# Patient Record
Sex: MALE | Race: WHITE | NOT HISPANIC OR LATINO
[De-identification: names, ages, dates, MRNs, and addresses within clinical notes are randomized per-mention and may not be internally consistent; named-entity substitution may affect disease eponyms.]

---

## 2023-04-18 PROBLEM — Z00.00 ENCOUNTER FOR PREVENTIVE HEALTH EXAMINATION: Status: ACTIVE | Noted: 2023-04-18

## 2023-04-19 ENCOUNTER — APPOINTMENT (OUTPATIENT)
Dept: RADIOLOGY | Facility: CLINIC | Age: 88
End: 2023-04-19
Payer: COMMERCIAL

## 2023-04-19 ENCOUNTER — APPOINTMENT (OUTPATIENT)
Dept: CT IMAGING | Facility: CLINIC | Age: 88
End: 2023-04-19
Payer: COMMERCIAL

## 2023-04-19 PROCEDURE — 70140 X-RAY EXAM OF FACIAL BONES: CPT

## 2023-04-19 PROCEDURE — 71120 X-RAY EXAM BREASTBONE 2/>VWS: CPT

## 2023-04-19 PROCEDURE — 70450 CT HEAD/BRAIN W/O DYE: CPT

## 2023-09-21 ENCOUNTER — APPOINTMENT (OUTPATIENT)
Dept: MRI IMAGING | Facility: CLINIC | Age: 88
End: 2023-09-21

## 2023-10-13 ENCOUNTER — APPOINTMENT (OUTPATIENT)
Dept: ULTRASOUND IMAGING | Facility: CLINIC | Age: 88
End: 2023-10-13
Payer: COMMERCIAL

## 2023-10-13 ENCOUNTER — APPOINTMENT (OUTPATIENT)
Dept: MRI IMAGING | Facility: CLINIC | Age: 88
End: 2023-10-13
Payer: SELF-PAY

## 2023-10-13 PROCEDURE — 72148 MRI LUMBAR SPINE W/O DYE: CPT

## 2023-10-13 PROCEDURE — 93970 EXTREMITY STUDY: CPT

## 2024-02-07 ENCOUNTER — TRANSCRIPTION ENCOUNTER (OUTPATIENT)
Age: 89
End: 2024-02-07

## 2024-02-07 ENCOUNTER — APPOINTMENT (OUTPATIENT)
Dept: VASCULAR SURGERY | Facility: CLINIC | Age: 89
End: 2024-02-07
Payer: COMMERCIAL

## 2024-02-07 VITALS
DIASTOLIC BLOOD PRESSURE: 80 MMHG | WEIGHT: 200 LBS | HEART RATE: 66 BPM | SYSTOLIC BLOOD PRESSURE: 154 MMHG | BODY MASS INDEX: 30.31 KG/M2 | HEIGHT: 68 IN

## 2024-02-07 DIAGNOSIS — K43.9 VENTRAL HERNIA W/OUT OBSTRUCTION OR GANGRENE: ICD-10-CM

## 2024-02-07 DIAGNOSIS — R60.0 LOCALIZED EDEMA: ICD-10-CM

## 2024-02-07 DIAGNOSIS — E66.9 OBESITY, UNSPECIFIED: ICD-10-CM

## 2024-02-07 DIAGNOSIS — Z87.891 PERSONAL HISTORY OF NICOTINE DEPENDENCE: ICD-10-CM

## 2024-02-07 DIAGNOSIS — M79.89 OTHER SPECIFIED SOFT TISSUE DISORDERS: ICD-10-CM

## 2024-02-07 DIAGNOSIS — Z85.828 PERSONAL HISTORY OF OTHER MALIGNANT NEOPLASM OF SKIN: ICD-10-CM

## 2024-02-07 PROCEDURE — 93970 EXTREMITY STUDY: CPT

## 2024-02-07 PROCEDURE — 99204 OFFICE O/P NEW MOD 45 MIN: CPT

## 2024-02-08 ENCOUNTER — EMERGENCY (EMERGENCY)
Facility: HOSPITAL | Age: 89
LOS: 1 days | Discharge: HOME | End: 2024-02-08
Attending: EMERGENCY MEDICINE | Admitting: EMERGENCY MEDICINE
Payer: COMMERCIAL

## 2024-02-08 VITALS
WEIGHT: 199.96 LBS | DIASTOLIC BLOOD PRESSURE: 79 MMHG | SYSTOLIC BLOOD PRESSURE: 179 MMHG | TEMPERATURE: 98 F | HEART RATE: 60 BPM | HEIGHT: 68 IN | OXYGEN SATURATION: 95 % | RESPIRATION RATE: 20 BRPM

## 2024-02-08 VITALS
TEMPERATURE: 97 F | RESPIRATION RATE: 17 BRPM | OXYGEN SATURATION: 96 % | SYSTOLIC BLOOD PRESSURE: 164 MMHG | HEART RATE: 73 BPM | DIASTOLIC BLOOD PRESSURE: 67 MMHG

## 2024-02-08 DIAGNOSIS — S62.616A DISPLACED FRACTURE OF PROXIMAL PHALANX OF RIGHT LITTLE FINGER, INITIAL ENCOUNTER FOR CLOSED FRACTURE: ICD-10-CM
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
## 2024-02-08 DIAGNOSIS — S02.2XXB FRACTURE OF NASAL BONES, INITIAL ENCOUNTER FOR OPEN FRACTURE: ICD-10-CM

## 2024-02-08 DIAGNOSIS — Z23 ENCOUNTER FOR IMMUNIZATION: ICD-10-CM
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
## 2024-02-08 DIAGNOSIS — S01.21XA LACERATION WITHOUT FOREIGN BODY OF NOSE, INITIAL ENCOUNTER: ICD-10-CM

## 2024-02-08 PROBLEM — E66.9 OBESITY (BMI 30.0-34.9): Status: ACTIVE | Noted: 2024-02-08

## 2024-02-08 PROBLEM — K43.9 VENTRAL HERNIA: Status: ACTIVE | Noted: 2024-02-08

## 2024-02-08 PROBLEM — Z85.828 HISTORY OF SKIN CANCER: Status: ACTIVE | Noted: 2024-02-07

## 2024-02-08 PROBLEM — Z87.891 FORMER SMOKER: Status: ACTIVE | Noted: 2024-02-07

## 2024-02-08 PROBLEM — M79.89 LEG SWELLING: Status: ACTIVE | Noted: 2024-02-07

## 2024-02-08 PROBLEM — R60.0 BILATERAL LEG EDEMA: Status: ACTIVE | Noted: 2024-02-08

## 2024-02-08 PROCEDURE — 70450 CT HEAD/BRAIN W/O DYE: CPT | Mod: MG

## 2024-02-08 PROCEDURE — 90471 IMMUNIZATION ADMIN: CPT

## 2024-02-08 PROCEDURE — 70486 CT MAXILLOFACIAL W/O DYE: CPT | Mod: 26,MG

## 2024-02-08 PROCEDURE — G1004: CPT

## 2024-02-08 PROCEDURE — 26725 TREAT FINGER FRACTURE EACH: CPT | Mod: F9

## 2024-02-08 PROCEDURE — 99285 EMERGENCY DEPT VISIT HI MDM: CPT | Mod: 25

## 2024-02-08 PROCEDURE — 70486 CT MAXILLOFACIAL W/O DYE: CPT | Mod: MG

## 2024-02-08 PROCEDURE — 21315 CLSD TX NSL FX MNPJ WO STBLJ: CPT

## 2024-02-08 PROCEDURE — 99284 EMERGENCY DEPT VISIT MOD MDM: CPT

## 2024-02-08 PROCEDURE — 73130 X-RAY EXAM OF HAND: CPT | Mod: 26,RT

## 2024-02-08 PROCEDURE — 90715 TDAP VACCINE 7 YRS/> IM: CPT

## 2024-02-08 PROCEDURE — 73130 X-RAY EXAM OF HAND: CPT

## 2024-02-08 PROCEDURE — 70450 CT HEAD/BRAIN W/O DYE: CPT | Mod: 26,MG

## 2024-02-08 PROCEDURE — 13152 CMPLX RPR E/N/E/L 2.6-7.5 CM: CPT | Mod: XU

## 2024-02-08 RX ORDER — MUPIROCIN 20 MG/G
1 OINTMENT TOPICAL
Qty: 1 | Refills: 0
Start: 2024-02-08

## 2024-02-08 RX ORDER — ACETAMINOPHEN, DEXTROMETHORPHAN HYDROBROMIDE, GUAIFENESIN, AND PHENYLEPHRINE HYDROCHLORIDE 325; 10; 200; 5 MG/1; MG/1; MG/1; MG/1
CAPSULE, LIQUID FILLED ORAL
Refills: 0 | Status: ACTIVE | COMMUNITY

## 2024-02-08 RX ORDER — ROSUVASTATIN CALCIUM 5 MG/1
TABLET, FILM COATED ORAL
Refills: 0 | Status: ACTIVE | COMMUNITY

## 2024-02-08 RX ORDER — PYRIDOSTIGMINE BROMIDE 60 MG/1
TABLET ORAL
Refills: 0 | Status: ACTIVE | COMMUNITY

## 2024-02-08 RX ORDER — DOCUSATE SODIUM 100 MG/1
CAPSULE ORAL
Refills: 0 | Status: ACTIVE | COMMUNITY

## 2024-02-08 RX ORDER — TETANUS TOXOID, REDUCED DIPHTHERIA TOXOID AND ACELLULAR PERTUSSIS VACCINE, ADSORBED 5; 2.5; 8; 8; 2.5 [IU]/.5ML; [IU]/.5ML; UG/.5ML; UG/.5ML; UG/.5ML
0.5 SUSPENSION INTRAMUSCULAR ONCE
Refills: 0 | Status: COMPLETED | OUTPATIENT
Start: 2024-02-08 | End: 2024-02-08

## 2024-02-08 RX ADMIN — TETANUS TOXOID, REDUCED DIPHTHERIA TOXOID AND ACELLULAR PERTUSSIS VACCINE, ADSORBED 0.5 MILLILITER(S): 5; 2.5; 8; 8; 2.5 SUSPENSION INTRAMUSCULAR at 14:37

## 2024-02-08 NOTE — ED PROVIDER NOTE - PATIENT PORTAL LINK FT
You can access the FollowMyHealth Patient Portal offered by [facility] by registering at the following website: [email or web address]. By joining [employer]’s FollowMyHealth portal, you will also be able to view your health information using other applications (apps) compatible with our system.

## 2024-02-08 NOTE — ED PROVIDER NOTE - MUSCULOSKELETAL, MLM
right hand +ecchymosis to 5th digit on on palmar ulnar portion of hand, +FROM 5th phalanx, sensation intact distally

## 2024-02-08 NOTE — ED ADULT NURSE NOTE - OTHER COMPLAINTS
Patient arrived to the ER with son c/o nose wound and rt hand pinky pain (bruise noted to affected area) s/p rolling off of bed this morning @5AM. Pt was told by [facility] to come to ER. Pt denies LOC, blood thinner usage, dizziness, vision changes, neck pain, N/V/D and denies any other medical complaints. Pt is noted to be aox3, able to maintain airway, having nonlabored breathing, no retractions noted, fully mobile with cane, non diaphoretic and able to talk in clear full sentences,

## 2024-02-08 NOTE — ED ADULT TRIAGE NOTE - OTHER COMPLAINTS
Patient arrived to the ER with son c/o nose wound and rt hand pinky pain s/p rolling off of bed this morning @5AM. Pt denies LOC, blood thinner usage and reports no other medical complaints. Pt is noted to be aox3, able to maintain airway, having nonlabored breathing, no retractions noted, non diaphoretic and able to talk in clear full sentences, Patient arrived to the ER with son c/o nose wound and rt hand pinky pain s/p rolling off of bed this morning @5AM. Pt denies LOC, blood thinner usage, dizziness, vision changes and denies any other medical complaints. Pt is noted to be aox3, able to maintain airway, having nonlabored breathing, no retractions noted, non diaphoretic and able to talk in clear full sentences, Patient arrived to the ER with son c/o nose wound and rt hand pinky pain (bruise noted to affected area) s/p rolling off of bed this morning @5AM. Pt was told by [facility] to come to ER. Pt denies LOC, blood thinner usage, dizziness, vision changes, neck pain, N/V/D and denies any other medical complaints. Pt is noted to be aox3, able to maintain airway, having nonlabored breathing, no retractions noted, fully mobile with cane, non diaphoretic and able to talk in clear full sentences,

## 2024-02-08 NOTE — ED ADULT NURSE NOTE - NSFALLUNIVINTERV_ED_ALL_ED
Latavya
no
Bed/Stretcher in lowest position, wheels locked, appropriate side rails in place/Call bell, personal items and telephone in reach/Instruct patient to call for assistance before getting out of bed/chair/stretcher/Non-slip footwear applied when patient is off stretcher/[place] to call system/Physically safe environment - no spills, clutter or unnecessary equipment/Purposeful proactive rounding/Room/bathroom lighting operational, light cord in reach

## 2024-02-08 NOTE — ED PROVIDER NOTE - NSFOLLOWUPINSTRUCTIONS_ED_ALL_ED_FT
Finger Fracture, Adult  A finger fracture is a break in any of the bones in your fingers. Your doctor may put a splint or cast on your finger so it will not move while it heals.    What are the causes?  The main cause of a finger fracture is injury, such as from playing sports, falling, or closing your finger in a drawer or door.    What increases the risk?  You may be more likely to get this condition:  Playing sports.  Working with machinery.  If you have weak bones (osteoporosis).  What are the signs or symptoms?  The main symptoms of a fractured finger are pain, bruising, and swelling shortly after the injury.    How is this treated?  Treatment depends on how severe your fracture is. If the bones are still in place, your doctor may put your finger in a splint. If many fingers are fractured, you may need a cast. A cast may be placed up to the elbow. This will keep your fingers and hand from moving.    If the bones are out of place, your doctor may move them back into place by hand or by surgery.    You may also need to do physical therapy exercises to help your finger move better and get stronger.    Follow these instructions at home:  If you have a removable splint:    Hand showing finger splint on index and middle fingers and wrist.  Wear the splint as told by your doctor. Take it off only as told by your doctor.  Check the skin around the splint every day. Tell your doctor if you see problems.  Loosen the splint if your fingers:  Tingle.  Become numb.  Turn cold and blue.  Keep the splint clean and dry.  If you have a nonremovable cast:    Do not put pressure on any part of the cast until it is fully hardened.  Do not stick anything inside the cast to scratch your skin.  Check the skin around the cast every day. Tell your doctor if you see problems.  You may put lotion on dry skin around the cast. Do not put lotion on the skin under the cast.  Keep the cast clean and dry.  Bathing    Do not take baths, swim, or use a hot tub. Ask your doctor if you may take showers.  If the splint or cast is not waterproof:  Do not let it get wet.  Cover it with a watertight covering when you take a bath or shower.  Managing pain, stiffness, and swelling    If told, put ice on the injured area. To do this:  If you have a removable splint, take it off as told by your doctor.  Put ice in a plastic bag.  Place a towel between your skin and the bag, or between your cast and the bag.  Leave the ice on for 20 minutes, 2–3 times a day.  Take off the ice if your skin turns bright red. This is very important. If you cannot feel pain, heat, or cold, you have a greater risk of damage to the area.  Move your fingers often.  Raise the injured area above the level of your heart while you are sitting or lying down.  Activity    Ask your doctor if you should avoid driving or using machines while you are taking your medicine.  Do physical therapy exercises as told by your doctor.  Return to your normal activities when your doctor says that it is safe.  Ask your doctor when it is safe to drive if you have a splint or cast on your finger.  General instructions    Do not smoke or use any products that contain nicotine or tobacco. Using these products can make it take longer for your bones to heal. If you need help quitting, ask your doctor.  Take over-the-counter and prescription medicines only as told by your doctor.  Keep all follow-up visits.  Contact a doctor if:  Your pain or swelling gets worse, even with treatment.  You have trouble moving your finger.  Get help right away if:  Your finger gets numb or turns blue.  Summary  A finger fracture is a break in any of the bones in your fingers.  You may need to wear a splint or cast on your finger, so it will not move while it heals.  If told, put ice on the injured area for 20 minutes, 2–3 times a day.  This information is not intended to replace advice given to you by your health care provider. Make sure you discuss any questions you have with your health care provider.

## 2024-02-08 NOTE — ED PROVIDER NOTE - OBJECTIVE STATEMENT
88 y/o m presents s/p fall out of bed this morning with laceration to nose.  Pt stating he was awake, laying too close to the edge when he fell over, hit his nose on the nightstand causing the laceration.  Pt reports mild pain to right hand, pinky finger.  Denies LOC, neck/back pain, all other ROS negative.  Unknown last tetanus.

## 2024-02-08 NOTE — ED ADULT NURSE NOTE - OBJECTIVE STATEMENT
89yoM came to ED s/p trip and falling into bedside table this am., Pt denies LOC and blood thinner use. + head strike. Pt states he went to  and they sent him to the ED. Pt c/o R fifth finger pain bruising and swelling, denies numbness and tingling. Ice packs applied. L nasal packed, bleeding controlled. Neuro intact. No SOB, chest pain. Pt ambulates with steady gait.

## 2024-02-08 NOTE — ED PROVIDER NOTE - CLINICAL SUMMARY MEDICAL DECISION MAKING FREE TEXT BOX
88 y/o m presents with facial lac s/p fall out of bed, right hand pain.  No LOC, no neuro deficits on exam, tetanus updated in ED.  CT head neg, CT maxillofacial shows b/l nasal bone fracture, xray right hand +proximal phalanx fracture.  Complicated lac repaired by plastics Dr. Red in ED, placed in fingersplint to right 5th phalanx, d/c on augmentin to f/u outpatient.

## 2024-02-08 NOTE — ED PROVIDER NOTE - ATTENDING CONTRIBUTION TO CARE
89 yo rolled out of bed, leaned too far, hit face on dresser, nostril tore/w flap, Denies hitting head hard, LOC, vision changes, focal weakness/numbness, neck/back pain, SOB/CP, HA, abd pain. Not on AC. Bruise on left hand. pending imaging, lac repair, reassess. 89 yo rolled out of bed, leaned too far, hit face on dresser, nostril tore w flap, Denies hitting head hard, LOC, vision changes, focal weakness/numbness, neck/back pain, SOB/CP, HA, abd pain. Not on AC. Bruise on left hand. pending imaging, lac repair, reassess.

## 2024-02-08 NOTE — ED PROVIDER NOTE - NS ED ATTENDING STATEMENT MOD
I have seen and examined this patient and fully participated in the care of this patient as the teaching attending.  The service was shared with the AKBAR.  I reviewed and verified the documentation.

## 2024-02-14 NOTE — ASSESSMENT
[Arterial/Venous Disease] : arterial/venous disease [Medication Management] : medication management [FreeTextEntry1] : 89 y/o M w/ BLE moderate edema R>L with mild SSV insufficiency. No evidence of thrombosis or GSV reflux. Obesity (BMI 30.41) and ventral hernia.  On exam, large abdominal girth with large reducible ventral hernia. Bounding femoral, popliteal, and pedal pulses equal bilaterally. BLE moderate edema R>L.  Venous duplex showed no DVT/SVT bilaterally. No deep or GSV reflux. Mild SSV reflux bilaterally.  Findings discussed with pt and wife. The swelling is not vascular in nature. The SSV reflux bilaterally is very mild and would not cause the degree of swelling present. We will order CT Venogram A/P to r/o any abdominopelvic vein compression and eval the ventral hernia. Recommendations include also to see a GI specialist for a routine colonoscopy. Additionally, I advised him to discuss with Dr Mccain if he is a good candidate for diuretic. Advised to wear compression stockings daily and pt to keep the skin well moisturized. Walking encouraged on a daily basis and avoid standing or sitting down for too long. Return prn, will call him with CT scan results.

## 2024-02-14 NOTE — PHYSICAL EXAM
[Respiratory Effort] : normal respiratory effort [No Rash or Lesion] : No rash or lesion [Alert] : alert [Oriented to Person] : oriented to person [Oriented to Place] : oriented to place [Oriented to Time] : oriented to time [Calm] : calm [2+] : left 2+ [Ankle Swelling (On Exam)] : present [Ankle Swelling Bilaterally] : bilaterally  [Ankle Swelling On The Left] : moderate [Abdomen Masses] : Abdomen masses present [Varicose Veins Of Lower Extremities] : not present [] : not present [Abdomen Tenderness] : ~T ~M No abdominal tenderness [FreeTextEntry1] : BLE moderate edema R>L. [de-identified] : WN/WD [de-identified] : Obese, large abdominal girth with large reducible ventral hernia.  [de-identified] : FROM

## 2024-02-14 NOTE — PROCEDURE
[FreeTextEntry1] :  Venous duplex ordered to r/o elida, shows: no DVT/SVT bilaterally. No deep or GSV reflux. Mild SSV reflux bilaterally.

## 2024-02-14 NOTE — CONSULT LETTER
[Dear  ___] : Dear  [unfilled], [FreeTextEntry2] : Ros Mccain M.D. [address], [address] [place] [address]  [FreeTextEntry1] : Thank you for referring Mr Kamlesh Kruger. He reports swelling on both legs which started around 3 months ago.  He has no past history of venous problems or thrombosis.  There is no history of kidney or heart disease.  He had an echocardiogram 6 months ago as well as a coronary CT scan with no significant findings. He has chronic lumbosacral spine issues as well as an midline abdominal hernia.  He is overweight. Denies any recent weight gain or weight loss. He reports constipation issues; last colonoscopy was 5 years ago.    On exam, large abdominal girth with large reducible ventral hernia. Bounding femoral, popliteal, and pedal pulses equal bilaterally. Moderate edema on both legs, worse on the right side.  Venous duplex showed no evidence of thrombosis or deep venous insufficiency in either leg.     Mr Kruger's swelling is not vascular in nature.  I recommend obtaining a CT venogram to rule out abdominal or pelvic abnormalities and/or venous compression.  I also suggested a GI evaluation for routine colonoscopy. He may be a good candidate for low-dose diuretic but  I advised him and his wife to discuss this with you.  If he can tolerate them I would also suggest compression stockings.  He also needs to keep the skin of his feet and legs well moisturized.    I will keep you posted regarding the results of the CT scan.  My complete EMR office note is below for your records.  [FreeTextEntry3] : Sincerely,   Matty Gao M.D.  , Surgical Services [facility] Surgeon-in-Chief, [facility] Professor of Surgery, [employer] at [facility]

## 2024-02-14 NOTE — HISTORY OF PRESENT ILLNESS
[FreeTextEntry1] : 89 y/o M referred by Dr Ros Mccain. He has a PMHx of arthritis, cellulitis, bladder stones, prostatectomy, ventral hernia, retina and cataract surgery, obesity (BMI 30.41) and skin cancer. He presents today for evaluation of leg and ankle swelling. He reports that the swelling started ~3 months ago. Denies any palpable cords, hx of DVT/SVT, wounds, leg surgeries, large bulging veins, rest pain, leg coolness or skin discoloration. No reports of kidney or heart disease. His last echo was ~6 months ago and also had a coronary CT scan, no significant findings per pt. He also endorses lower back pain that has been occurring for ~ 3 months, mainly when he has been sitting for a while or when he is standing up. He has issues with his balance and uses a cane for walking. He had a MRI of the lumbar spine recently. Denies any weight gain/loss, SOB, trouble with urination however he does report constipation. He reports he is on Colace and X-lax to treat his constipation, which has been worsening. He has not have a colonoscopy in a while. Last one was ~5 yrs ago at [facility].   SHx: - Works as realtor  Accompanied by wife.

## 2024-02-14 NOTE — ADDENDUM
[FreeTextEntry1] : I, Dr. Matty Gao, personally performed the evaluation and management (E/M) services for this new patient.  That E/M includes conducting the initial examination, assessing all conditions, and establishing the plan of care.  Today, my ACP, Emily Santos NP, was here to observe my evaluation and management services for this patient to be followed going forward.

## 2024-02-22 ENCOUNTER — OUTPATIENT (OUTPATIENT)
Dept: OUTPATIENT SERVICES | Facility: HOSPITAL | Age: 89
LOS: 1 days | End: 2024-02-22
Payer: COMMERCIAL

## 2024-02-22 ENCOUNTER — APPOINTMENT (OUTPATIENT)
Dept: CT IMAGING | Facility: CLINIC | Age: 89
End: 2024-02-22
Payer: COMMERCIAL

## 2024-02-22 PROCEDURE — 73140 X-RAY EXAM OF FINGER(S): CPT

## 2024-02-22 PROCEDURE — 73140 X-RAY EXAM OF FINGER(S): CPT | Mod: 26,RT

## 2024-02-22 PROCEDURE — 74174 CTA ABD&PLVS W/CONTRAST: CPT

## 2024-03-05 ENCOUNTER — OUTPATIENT (OUTPATIENT)
Dept: OUTPATIENT SERVICES | Facility: HOSPITAL | Age: 89
LOS: 1 days | End: 2024-03-05
Payer: MEDICARE

## 2024-03-05 PROCEDURE — 73130 X-RAY EXAM OF HAND: CPT

## 2024-03-05 PROCEDURE — 73130 X-RAY EXAM OF HAND: CPT | Mod: 26,RT

## 2025-02-06 ENCOUNTER — EMERGENCY (EMERGENCY)
Facility: HOSPITAL | Age: 89
LOS: 1 days | Discharge: HOME | End: 2025-02-06
Attending: STUDENT IN AN ORGANIZED HEALTH CARE EDUCATION/TRAINING PROGRAM | Admitting: STUDENT IN AN ORGANIZED HEALTH CARE EDUCATION/TRAINING PROGRAM
Payer: MEDICARE

## 2025-02-06 VITALS
HEART RATE: 75 BPM | OXYGEN SATURATION: 100 % | SYSTOLIC BLOOD PRESSURE: 157 MMHG | DIASTOLIC BLOOD PRESSURE: 97 MMHG | TEMPERATURE: 99 F | RESPIRATION RATE: 19 BRPM | WEIGHT: 179.9 LBS | HEIGHT: 68 IN

## 2025-02-06 VITALS
TEMPERATURE: 99 F | OXYGEN SATURATION: 100 % | DIASTOLIC BLOOD PRESSURE: 78 MMHG | HEART RATE: 68 BPM | RESPIRATION RATE: 18 BRPM | SYSTOLIC BLOOD PRESSURE: 141 MMHG
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
## 2025-02-06 DIAGNOSIS — Z79.01 LONG TERM (CURRENT) USE OF ANTICOAGULANTS: ICD-10-CM

## 2025-02-06 DIAGNOSIS — S00.11XA CONTUSION OF RIGHT EYELID AND PERIOCULAR AREA, INITIAL ENCOUNTER: ICD-10-CM
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
## 2025-02-06 DIAGNOSIS — I48.91 UNSPECIFIED ATRIAL FIBRILLATION: ICD-10-CM

## 2025-02-06 DIAGNOSIS — S09.93XA UNSPECIFIED INJURY OF FACE, INITIAL ENCOUNTER: ICD-10-CM

## 2025-02-06 DIAGNOSIS — G70.00 MYASTHENIA GRAVIS WITHOUT (ACUTE) EXACERBATION: ICD-10-CM

## 2025-02-06 LAB
ALBUMIN SERPL ELPH-MCNC: 3.6 G/DL — SIGNIFICANT CHANGE UP (ref 3.3–5)
ALP SERPL-CCNC: 65 U/L — SIGNIFICANT CHANGE UP (ref 40–120)
ALT FLD-CCNC: 17 U/L — SIGNIFICANT CHANGE UP (ref 10–45)
ANION GAP SERPL CALC-SCNC: 10 MMOL/L — SIGNIFICANT CHANGE UP (ref 5–17)
APTT BLD: 35.5 SEC — SIGNIFICANT CHANGE UP (ref 24.5–35.6)
AST SERPL-CCNC: 29 U/L — SIGNIFICANT CHANGE UP (ref 10–40)
BASOPHILS # BLD AUTO: 0.02 K/UL — SIGNIFICANT CHANGE UP (ref 0–0.2)
BASOPHILS NFR BLD AUTO: 0.2 % — SIGNIFICANT CHANGE UP (ref 0–2)
BILIRUB SERPL-MCNC: 0.6 MG/DL — SIGNIFICANT CHANGE UP (ref 0.2–1.2)
BUN SERPL-MCNC: 20 MG/DL — SIGNIFICANT CHANGE UP (ref 7–23)
CALCIUM SERPL-MCNC: 10 MG/DL — SIGNIFICANT CHANGE UP (ref 8.4–10.5)
CHLORIDE SERPL-SCNC: 103 MMOL/L — SIGNIFICANT CHANGE UP (ref 96–108)
CO2 SERPL-SCNC: 25 MMOL/L — SIGNIFICANT CHANGE UP (ref 22–31)
CREAT SERPL-MCNC: 1.12 MG/DL — SIGNIFICANT CHANGE UP (ref 0.5–1.3)
EGFR: 63 ML/MIN/1.73M2 — SIGNIFICANT CHANGE UP
EOSINOPHIL # BLD AUTO: 0.02 K/UL — SIGNIFICANT CHANGE UP (ref 0–0.5)
EOSINOPHIL NFR BLD AUTO: 0.2 % — SIGNIFICANT CHANGE UP (ref 0–6)
GLUCOSE SERPL-MCNC: 114 MG/DL — HIGH (ref 70–99)
HCT VFR BLD CALC: 45.2 % — SIGNIFICANT CHANGE UP (ref 39–50)
HGB BLD-MCNC: 14.9 G/DL — SIGNIFICANT CHANGE UP (ref 13–17)
IMM GRANULOCYTES NFR BLD AUTO: 0.7 % — SIGNIFICANT CHANGE UP (ref 0–0.9)
INR BLD: 1.21 — HIGH (ref 0.85–1.16)
LYMPHOCYTES # BLD AUTO: 0.57 K/UL — LOW (ref 1–3.3)
LYMPHOCYTES # BLD AUTO: 6.9 % — LOW (ref 13–44)
MCHC RBC-ENTMCNC: 29.9 PG — SIGNIFICANT CHANGE UP (ref 27–34)
MCHC RBC-ENTMCNC: 33 G/DL — SIGNIFICANT CHANGE UP (ref 32–36)
MCV RBC AUTO: 90.6 FL — SIGNIFICANT CHANGE UP (ref 80–100)
MONOCYTES # BLD AUTO: 0.88 K/UL — SIGNIFICANT CHANGE UP (ref 0–0.9)
MONOCYTES NFR BLD AUTO: 10.6 % — SIGNIFICANT CHANGE UP (ref 2–14)
NEUTROPHILS # BLD AUTO: 6.75 K/UL — SIGNIFICANT CHANGE UP (ref 1.8–7.4)
NEUTROPHILS NFR BLD AUTO: 81.4 % — HIGH (ref 43–77)
NRBC # BLD: 0 /100 WBCS — SIGNIFICANT CHANGE UP (ref 0–0)
NRBC BLD-RTO: 0 /100 WBCS — SIGNIFICANT CHANGE UP (ref 0–0)
PLATELET # BLD AUTO: 117 K/UL — LOW (ref 150–400)
POTASSIUM SERPL-MCNC: 4.1 MMOL/L — SIGNIFICANT CHANGE UP (ref 3.5–5.3)
POTASSIUM SERPL-SCNC: 4.1 MMOL/L — SIGNIFICANT CHANGE UP (ref 3.5–5.3)
PROT SERPL-MCNC: 6.6 G/DL — SIGNIFICANT CHANGE UP (ref 6–8.3)
PROTHROM AB SERPL-ACNC: 14.1 SEC — HIGH (ref 9.9–13.4)
RBC # BLD: 4.99 M/UL — SIGNIFICANT CHANGE UP (ref 4.2–5.8)
RBC # FLD: 15.9 % — HIGH (ref 10.3–14.5)
SODIUM SERPL-SCNC: 138 MMOL/L — SIGNIFICANT CHANGE UP (ref 135–145)
TROPONIN T, HIGH SENSITIVITY RESULT: 38 NG/L — SIGNIFICANT CHANGE UP (ref 0–51)
WBC # BLD: 8.3 K/UL — SIGNIFICANT CHANGE UP (ref 3.8–10.5)
WBC # FLD AUTO: 8.3 K/UL — SIGNIFICANT CHANGE UP (ref 3.8–10.5)

## 2025-02-06 PROCEDURE — 93010 ELECTROCARDIOGRAM REPORT: CPT

## 2025-02-06 PROCEDURE — 70486 CT MAXILLOFACIAL W/O DYE: CPT | Mod: MC

## 2025-02-06 PROCEDURE — 84484 ASSAY OF TROPONIN QUANT: CPT

## 2025-02-06 PROCEDURE — 93005 ELECTROCARDIOGRAM TRACING: CPT

## 2025-02-06 PROCEDURE — 72125 CT NECK SPINE W/O DYE: CPT | Mod: MC

## 2025-02-06 PROCEDURE — 82962 GLUCOSE BLOOD TEST: CPT

## 2025-02-06 PROCEDURE — 72125 CT NECK SPINE W/O DYE: CPT | Mod: 26

## 2025-02-06 PROCEDURE — 85610 PROTHROMBIN TIME: CPT

## 2025-02-06 PROCEDURE — 80053 COMPREHEN METABOLIC PANEL: CPT

## 2025-02-06 PROCEDURE — 70450 CT HEAD/BRAIN W/O DYE: CPT | Mod: 26

## 2025-02-06 PROCEDURE — 99285 EMERGENCY DEPT VISIT HI MDM: CPT | Mod: 25

## 2025-02-06 PROCEDURE — 70450 CT HEAD/BRAIN W/O DYE: CPT | Mod: MC

## 2025-02-06 PROCEDURE — 70486 CT MAXILLOFACIAL W/O DYE: CPT | Mod: 26

## 2025-02-06 PROCEDURE — 99284 EMERGENCY DEPT VISIT MOD MDM: CPT

## 2025-02-06 PROCEDURE — 85730 THROMBOPLASTIN TIME PARTIAL: CPT

## 2025-02-06 PROCEDURE — 36415 COLL VENOUS BLD VENIPUNCTURE: CPT

## 2025-02-06 PROCEDURE — 85025 COMPLETE CBC W/AUTO DIFF WBC: CPT

## 2025-02-06 NOTE — ED PROVIDER NOTE - NSFOLLOWUPINSTRUCTIONS_ED_ALL_ED_FT
Follow up with your primary medical doctor as soon as possible.    Return to the emergency department if your symptoms worsen or if you develop new symptoms.  If you have any problems with followup, please call the ED Referral Coordinator at [phone].    Fall Prevention in the Home, Adult    Falls can cause injuries and can affect people from all age groups. There are many simple things that you can do to make your home safe and to help prevent falls. Ask for help when making these changes, if needed.    What actions can I take to prevent falls?    General instructions:  •Use good lighting in all rooms. Replace any light bulbs that burn out.  •Turn on lights if it is dark. Use night-lights.  •Place frequently used items in easy-to-reach places. Lower the shelves around your home if necessary.  •Set up furniture so that there are clear paths around it. Avoid moving your furniture around.  •Remove throw rugs and other tripping hazards from the floor.  •Avoid walking on wet floors.  •Fix any uneven floor surfaces.  •Add color or contrast paint or tape to grab bars and handrails in your home. Place contrasting color strips on the first and last steps of stairways.  •When you use a stepladder, make sure that it is completely opened and that the sides are firmly locked. Have someone hold the ladder while you are using it. Do not climb a closed stepladder.  •Be aware of any and all pets.    What can I do in the bathroom?   •Keep the floor dry. Immediately clean up any water that spills onto the floor.  •Remove soap buildup in the tub or shower on a regular basis.  •Use non-skid mats or decals on the floor of the tub or shower.  •Attach bath mats securely with double-sided, non-slip rug tape.  •If you need to sit down while you are in the shower, use a plastic, non-slip stool.  •Install grab bars by the toilet and in the tub and shower. Do not use towel bars as grab bars.    What can I do in the bedroom?   •Make sure that a bedside light is easy to reach.  • Do not use oversized bedding that drapes onto the floor.  •Have a firm chair that has side arms to use for getting dressed.    What can I do in the kitchen?   •Clean up any spills right away.  •If you need to reach for something above you, use a sturdy step stool that has a grab bar.  •Keep electrical cables out of the way.  • Do not use floor polish or wax that makes floors slippery. If you must use wax, make sure that it is non-skid floor wax.    What can I do in the stairways?   • Do not leave any items on the stairs.  •Make sure that you have a light switch at the top of the stairs and the bottom of the stairs. Have them installed if you do not have them.  •Make sure that there are handrails on both sides of the stairs. Fix handrails that are broken or loose. Make sure that handrails are as long as the stairways.  •Install non-slip stair treads on all stairs in your home.  •Avoid having throw rugs at the top or bottom of stairways, or secure the rugs with carpet tape to prevent them from moving.  •Choose a carpet design that does not hide the edge of steps on the stairway.  •Check any carpeting to make sure that it is firmly attached to the stairs. Fix any carpet that is loose or worn.    What can I do on the outside of my home?   •Use bright outdoor lighting.  •Regularly repair the edges of walkways and driveways and fix any cracks.  •Remove high doorway thresholds.  •Trim any shrubbery on the main path into your home.  •Regularly check that handrails are securely fastened and in good repair. Both sides of any steps should have handrails.  •Install guardrails along the edges of any raised decks or porches.  •Clear walkways of debris and clutter, including tools and rocks.  •Have leaves, snow, and ice cleared regularly.  •Use sand or salt on walkways during winter months.  •In the garage, clean up any spills right away, including grease or oil spills.    What other actions can I take?   •Wear closed-toe shoes that fit well and support your feet. Wear shoes that have rubber soles or low heels.  •Use mobility aids as needed, such as canes, walkers, scooters, and crutches.  •Review your medicines with your health care provider. Some medicines can cause dizziness or changes in blood pressure, which increase your risk of falling.    Talk with your health care provider about other ways that you can decrease your risk of falls. This may include working with a physical therapist or  to improve your strength, balance, and endurance.    Where to find more information  •Centers for Disease Control and Prevention, [place]: https://www.cdc.gov  •National [place] on Aging: https://gu8jnzu.ghulam.nih.gov    Contact a health care provider if:  •You are afraid of falling at home.  •You feel weak, drowsy, or dizzy at home.  •You fall at home.    Summary  •There are many simple things that you can do to make your home safe and to help prevent falls.  •Ways to make your home safe include removing tripping hazards and installing grab bars in the bathroom.  •Ask for help when making these changes in your home.  This information is not intended to replace advice given to you by your health care provider. Make sure you discuss any questions you have with your health care provider.

## 2025-02-06 NOTE — ED PROVIDER NOTE - OBJECTIVE STATEMENT
89 yo M w PMH of afib on [facility], sent to ED for UC for mechanical fall w R facial trauma. Pt states he is unsure of how fall occurred, but he had no LOC. R face is swollen and bruised, no other injuries. Pt was ambulatory after the fall. His R eye is swollen shut, but prior to swelling, he notes vision was intact. Minimal pain. No neck pain.

## 2025-02-06 NOTE — ED PROVIDER NOTE - NS ED ROS FT
CONSTITUTIONAL: No fever, no chills, no fatigue, + fall  EYES: No eye redness, no visual changes  ENT: No ear pain, no sore throat  CARDIOVASCULAR: No chest pain, no palpitations  RESPIRATORY: No cough, no SOB  GI: No abdominal pain, no nausea, no vomiting, no constipation, no diarrhea  GENITOURINARY: No dysuria, no frequency, no hematuria  MUSCULOSKELETAL: No back pain, no joint pain, no myalgias  SKIN: No rash, no peripheral edema  NEURO: +headache, no confusion    ALL OTHER SYSTEMS NEGATIVE.

## 2025-02-06 NOTE — ED PROVIDER NOTE - CLINICAL SUMMARY MEDICAL DECISION MAKING FREE TEXT BOX
Presents s/p mechanical fall w head injury  Pt is on eliquis  Injury to R periorbital soft tissue, no evidence of occular injury, no change to vision.  Will obtain: CT head/C-spine/Maxillofacial  No other injuries, neuro intact, ambulatory at baseline in ED.

## 2025-02-06 NOTE — ED PROVIDER NOTE - PATIENT PORTAL LINK FT
You can access the FollowMyHealth Patient Portal offered by [facility] by registering at the following website: [email or web address]. By joining [employer]’s FollowMyHealth portal, you will also be able to view your health information using other applications (apps) compatible with our system.

## 2025-02-06 NOTE — ED ADULT NURSE NOTE - NSFALLHARMRISKINTERV_ED_ALL_ED

## 2025-02-06 NOTE — ED PROVIDER NOTE - PHYSICAL EXAMINATION
CONSTITUTIONAL: Non-toxic; in no apparent distress  HEAD: Normocephalic; + R periorbital bruising and edema  EYES: PERRL; EOM intact, no APD, no hyphema, no hypopyon  ENMT: External appears normal  NECK: Supple; non-tender  CARD: Normal S1, S2; no murmurs, rubs, or gallops  RESP: Normal chest excursion with respiration; breath sounds clear and equal bilaterally  ABD: Soft, non-distended; non-tender  EXT: Normal ROM in all four extremities; non-tender to palpation, no peripheral edema  SKIN: Warm, dry, no rash  NEURO:  No focal neurological deficiencies, CN 2-12 intact BL, no dysmetria, no pronator drift, gait normal, strength/sensation intact throughout, normal cognition

## 2025-02-06 NOTE — ED ADULT TRIAGE NOTE - CHIEF COMPLAINT QUOTE
Pt c/o fall this AM with residual face trauma and swelling. Pt endorsed "I was walking and I don't know what happened". Pt unclear LOC, endorsed positive use for anti coag takes Eliquis daily. Pt went to UC and referred to ER for CT imaging.  Waqar afib.

## 2025-02-06 NOTE — ED ADULT NURSE NOTE - OBJECTIVE STATEMENT
fall uknown mechanism on blood thinners large hematoma right for head denies head ache numbness tingling cp sob

## 2025-02-10 ENCOUNTER — INPATIENT (INPATIENT)
Facility: HOSPITAL | Age: 89
LOS: 2 days | Discharge: HOME | DRG: 871 | End: 2025-02-13
Attending: STUDENT IN AN ORGANIZED HEALTH CARE EDUCATION/TRAINING PROGRAM | Admitting: STUDENT IN AN ORGANIZED HEALTH CARE EDUCATION/TRAINING PROGRAM
Payer: MEDICARE

## 2025-02-10 VITALS
TEMPERATURE: 98 F | OXYGEN SATURATION: 86 % | WEIGHT: 179.9 LBS | SYSTOLIC BLOOD PRESSURE: 141 MMHG | RESPIRATION RATE: 20 BRPM | HEART RATE: 98 BPM | HEIGHT: 68 IN | DIASTOLIC BLOOD PRESSURE: 82 MMHG
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
## 2025-02-10 DIAGNOSIS — I48.91 UNSPECIFIED ATRIAL FIBRILLATION: ICD-10-CM

## 2025-02-10 DIAGNOSIS — Z29.9 ENCOUNTER FOR PROPHYLACTIC MEASURES, UNSPECIFIED: ICD-10-CM

## 2025-02-10 DIAGNOSIS — E78.5 HYPERLIPIDEMIA, UNSPECIFIED: ICD-10-CM

## 2025-02-10 DIAGNOSIS — R41.82 ALTERED MENTAL STATUS, UNSPECIFIED: ICD-10-CM

## 2025-02-10 DIAGNOSIS — J10.1 INFLUENZA DUE TO OTHER IDENTIFIED INFLUENZA VIRUS WITH OTHER RESPIRATORY MANIFESTATIONS: ICD-10-CM

## 2025-02-10 DIAGNOSIS — A41.9 SEPSIS, UNSPECIFIED ORGANISM: ICD-10-CM

## 2025-02-10 DIAGNOSIS — R60.0 LOCALIZED EDEMA: ICD-10-CM

## 2025-02-10 DIAGNOSIS — G70.00 MYASTHENIA GRAVIS WITHOUT (ACUTE) EXACERBATION: ICD-10-CM

## 2025-02-10 DIAGNOSIS — J96.01 ACUTE RESPIRATORY FAILURE WITH HYPOXIA: ICD-10-CM

## 2025-02-10 DIAGNOSIS — J18.9 PNEUMONIA, UNSPECIFIED ORGANISM: ICD-10-CM

## 2025-02-10 LAB
ALBUMIN SERPL ELPH-MCNC: 4 G/DL — SIGNIFICANT CHANGE UP (ref 3.3–5)
ALP SERPL-CCNC: 74 U/L — SIGNIFICANT CHANGE UP (ref 40–120)
ALT FLD-CCNC: 23 U/L — SIGNIFICANT CHANGE UP (ref 10–45)
ANION GAP SERPL CALC-SCNC: 14 MMOL/L — SIGNIFICANT CHANGE UP (ref 5–17)
APPEARANCE UR: CLEAR — SIGNIFICANT CHANGE UP
APTT BLD: 31.2 SEC — SIGNIFICANT CHANGE UP (ref 24.5–35.6)
AST SERPL-CCNC: 25 U/L — SIGNIFICANT CHANGE UP (ref 10–40)
BASE EXCESS BLDV CALC-SCNC: 7.5 MMOL/L — HIGH (ref -2–3)
BASOPHILS # BLD AUTO: 0 K/UL — SIGNIFICANT CHANGE UP (ref 0–0.2)
BASOPHILS NFR BLD AUTO: 0 % — SIGNIFICANT CHANGE UP (ref 0–2)
BILIRUB SERPL-MCNC: 0.9 MG/DL — SIGNIFICANT CHANGE UP (ref 0.2–1.2)
BILIRUB UR-MCNC: NEGATIVE — SIGNIFICANT CHANGE UP
BUN SERPL-MCNC: 19 MG/DL — SIGNIFICANT CHANGE UP (ref 7–23)
CALCIUM SERPL-MCNC: 10.1 MG/DL — SIGNIFICANT CHANGE UP (ref 8.4–10.5)
CHLORIDE SERPL-SCNC: 98 MMOL/L — SIGNIFICANT CHANGE UP (ref 96–108)
CO2 BLDV-SCNC: 35 MMOL/L — HIGH (ref 22–26)
CO2 SERPL-SCNC: 27 MMOL/L — SIGNIFICANT CHANGE UP (ref 22–31)
COLOR SPEC: YELLOW — SIGNIFICANT CHANGE UP
CREAT SERPL-MCNC: 1.02 MG/DL — SIGNIFICANT CHANGE UP (ref 0.5–1.3)
DIFF PNL FLD: NEGATIVE — SIGNIFICANT CHANGE UP
EGFR: 71 ML/MIN/1.73M2 — SIGNIFICANT CHANGE UP
EOSINOPHIL # BLD AUTO: 0 K/UL — SIGNIFICANT CHANGE UP (ref 0–0.5)
EOSINOPHIL NFR BLD AUTO: 0 % — SIGNIFICANT CHANGE UP (ref 0–6)
FLUAV AG NPH QL: DETECTED
FLUBV AG NPH QL: SIGNIFICANT CHANGE UP
GLUCOSE SERPL-MCNC: 183 MG/DL — HIGH (ref 70–99)
GLUCOSE UR QL: NEGATIVE MG/DL — SIGNIFICANT CHANGE UP
HCO3 BLDV-SCNC: 33 MMOL/L — HIGH (ref 22–29)
HCT VFR BLD CALC: 48.2 % — SIGNIFICANT CHANGE UP (ref 39–50)
HGB BLD-MCNC: 15.8 G/DL — SIGNIFICANT CHANGE UP (ref 13–17)
INR BLD: 1.35 — HIGH (ref 0.85–1.16)
KETONES UR-MCNC: ABNORMAL MG/DL
LACTATE SERPL-SCNC: 1.4 MMOL/L — SIGNIFICANT CHANGE UP (ref 0.5–2)
LACTATE SERPL-SCNC: 2.7 MMOL/L — HIGH (ref 0.5–2)
LEUKOCYTE ESTERASE UR-ACNC: NEGATIVE — SIGNIFICANT CHANGE UP
LYMPHOCYTES # BLD AUTO: 0.36 K/UL — LOW (ref 1–3.3)
LYMPHOCYTES # BLD AUTO: 3.5 % — LOW (ref 13–44)
MCHC RBC-ENTMCNC: 29.6 PG — SIGNIFICANT CHANGE UP (ref 27–34)
MCHC RBC-ENTMCNC: 32.8 G/DL — SIGNIFICANT CHANGE UP (ref 32–36)
MCV RBC AUTO: 90.4 FL — SIGNIFICANT CHANGE UP (ref 80–100)
MONOCYTES # BLD AUTO: 0.17 K/UL — SIGNIFICANT CHANGE UP (ref 0–0.9)
MONOCYTES NFR BLD AUTO: 1.7 % — LOW (ref 2–14)
NEUTROPHILS # BLD AUTO: 9.68 K/UL — HIGH (ref 1.8–7.4)
NEUTROPHILS NFR BLD AUTO: 94.8 % — HIGH (ref 43–77)
NITRITE UR-MCNC: NEGATIVE — SIGNIFICANT CHANGE UP
PCO2 BLDV: 50 MMHG — SIGNIFICANT CHANGE UP (ref 42–55)
PH BLDV: 7.43 — SIGNIFICANT CHANGE UP (ref 7.32–7.43)
PH UR: 7 — SIGNIFICANT CHANGE UP (ref 5–8)
PLATELET # BLD AUTO: 165 K/UL — SIGNIFICANT CHANGE UP (ref 150–400)
PO2 BLDV: <33 MMHG — LOW (ref 25–45)
POTASSIUM SERPL-MCNC: 3.8 MMOL/L — SIGNIFICANT CHANGE UP (ref 3.5–5.3)
POTASSIUM SERPL-SCNC: 3.8 MMOL/L — SIGNIFICANT CHANGE UP (ref 3.5–5.3)
PROT SERPL-MCNC: 6.9 G/DL — SIGNIFICANT CHANGE UP (ref 6–8.3)
PROT UR-MCNC: 100 MG/DL
PROTHROM AB SERPL-ACNC: 15.7 SEC — HIGH (ref 9.9–13.4)
RBC # BLD: 5.33 M/UL — SIGNIFICANT CHANGE UP (ref 4.2–5.8)
RBC # FLD: 15.7 % — HIGH (ref 10.3–14.5)
RSV RNA NPH QL NAA+NON-PROBE: SIGNIFICANT CHANGE UP
SAO2 % BLDV: 21.4 % — LOW (ref 67–88)
SARS-COV-2 RNA SPEC QL NAA+PROBE: SIGNIFICANT CHANGE UP
SODIUM SERPL-SCNC: 139 MMOL/L — SIGNIFICANT CHANGE UP (ref 135–145)
SP GR SPEC: 1.02 — SIGNIFICANT CHANGE UP (ref 1–1.03)
UROBILINOGEN FLD QL: 1 MG/DL — SIGNIFICANT CHANGE UP (ref 0.2–1)
WBC # BLD: 10.21 K/UL — SIGNIFICANT CHANGE UP (ref 3.8–10.5)
WBC # FLD AUTO: 10.21 K/UL — SIGNIFICANT CHANGE UP (ref 3.8–10.5)

## 2025-02-10 PROCEDURE — 99291 CRITICAL CARE FIRST HOUR: CPT

## 2025-02-10 PROCEDURE — 71045 X-RAY EXAM CHEST 1 VIEW: CPT | Mod: 26,76

## 2025-02-10 PROCEDURE — 99223 1ST HOSP IP/OBS HIGH 75: CPT | Mod: GC

## 2025-02-10 RX ORDER — ACETAMINOPHEN 160 MG/5ML
1000 SUSPENSION ORAL ONCE
Refills: 0 | Status: COMPLETED | OUTPATIENT
Start: 2025-02-10 | End: 2025-02-10

## 2025-02-10 RX ORDER — CEFTRIAXONE 250 MG/1
1000 INJECTION, POWDER, FOR SOLUTION INTRAMUSCULAR; INTRAVENOUS ONCE
Refills: 0 | Status: COMPLETED | OUTPATIENT
Start: 2025-02-10 | End: 2025-02-10

## 2025-02-10 RX ORDER — APIXABAN 5 MG/1
2.5 TABLET, FILM COATED ORAL
Refills: 0 | Status: DISCONTINUED | OUTPATIENT
Start: 2025-02-10 | End: 2025-02-13

## 2025-02-10 RX ORDER — ONDANSETRON 4 MG/1
4 TABLET, ORALLY DISINTEGRATING ORAL EVERY 8 HOURS
Refills: 0 | Status: DISCONTINUED | OUTPATIENT
Start: 2025-02-10 | End: 2025-02-13

## 2025-02-10 RX ORDER — ROSUVASTATIN CALCIUM 10 MG/1
5 TABLET, FILM COATED ORAL AT BEDTIME
Refills: 0 | Status: DISCONTINUED | OUTPATIENT
Start: 2025-02-10 | End: 2025-02-13

## 2025-02-10 RX ORDER — ROSUVASTATIN CALCIUM 10 MG/1
5 TABLET, FILM COATED ORAL AT BEDTIME
Refills: 0 | Status: DISCONTINUED | OUTPATIENT
Start: 2025-02-10 | End: 2025-02-10

## 2025-02-10 RX ORDER — BACTERIOSTATIC SODIUM CHLORIDE 0.9 %
1000 VIAL (ML) INJECTION ONCE
Refills: 0 | Status: COMPLETED | OUTPATIENT
Start: 2025-02-10 | End: 2025-02-10

## 2025-02-10 RX ORDER — PYRIDOSTIGMINE BROMIDE 60 MG/5ML
2 SOLUTION ORAL
Refills: 0 | DISCHARGE

## 2025-02-10 RX ORDER — PYRIDOSTIGMINE BROMIDE 60 MG/5ML
60 SOLUTION ORAL THREE TIMES A DAY
Refills: 0 | Status: DISCONTINUED | OUTPATIENT
Start: 2025-02-10 | End: 2025-02-11

## 2025-02-10 RX ORDER — PYRIDOSTIGMINE BROMIDE 60 MG/5ML
120 SOLUTION ORAL THREE TIMES A DAY
Refills: 0 | Status: DISCONTINUED | OUTPATIENT
Start: 2025-02-10 | End: 2025-02-10

## 2025-02-10 RX ORDER — IPRATROPIUM BROMIDE AND ALBUTEROL SULFATE .5; 2.5 MG/3ML; MG/3ML
3 SOLUTION RESPIRATORY (INHALATION) EVERY 6 HOURS
Refills: 0 | Status: DISCONTINUED | OUTPATIENT
Start: 2025-02-10 | End: 2025-02-13

## 2025-02-10 RX ORDER — ROSUVASTATIN CALCIUM 10 MG/1
1 TABLET, FILM COATED ORAL
Refills: 0 | DISCHARGE

## 2025-02-10 RX ORDER — APIXABAN 5 MG/1
1 TABLET, FILM COATED ORAL
Refills: 0 | DISCHARGE

## 2025-02-10 RX ORDER — CEFTRIAXONE 250 MG/1
1000 INJECTION, POWDER, FOR SOLUTION INTRAMUSCULAR; INTRAVENOUS EVERY 24 HOURS
Refills: 0 | Status: DISCONTINUED | OUTPATIENT
Start: 2025-02-11 | End: 2025-02-11

## 2025-02-10 RX ORDER — ACETAMINOPHEN 160 MG/5ML
650 SUSPENSION ORAL EVERY 6 HOURS
Refills: 0 | Status: DISCONTINUED | OUTPATIENT
Start: 2025-02-10 | End: 2025-02-10

## 2025-02-10 RX ORDER — ALPRAZOLAM 2 MG
0.5 TABLET ORAL ONCE
Refills: 0 | Status: DISCONTINUED | OUTPATIENT
Start: 2025-02-10 | End: 2025-02-10

## 2025-02-10 RX ORDER — AZITHROMYCIN DIHYDRATE 500 MG/1
500 TABLET, FILM COATED ORAL ONCE
Refills: 0 | Status: COMPLETED | OUTPATIENT
Start: 2025-02-10 | End: 2025-02-10

## 2025-02-10 RX ORDER — MAGNESIUM, ALUMINUM HYDROXIDE 200-225/5
30 SUSPENSION, ORAL (FINAL DOSE FORM) ORAL EVERY 4 HOURS
Refills: 0 | Status: DISCONTINUED | OUTPATIENT
Start: 2025-02-10 | End: 2025-02-13

## 2025-02-10 RX ORDER — ACETAMINOPHEN, DIPHENHYDRAMINE HCL, PHENYLEPHRINE HCL 325; 25; 5 MG/1; MG/1; MG/1
3 TABLET ORAL AT BEDTIME
Refills: 0 | Status: DISCONTINUED | OUTPATIENT
Start: 2025-02-10 | End: 2025-02-12

## 2025-02-10 RX ORDER — AZITHROMYCIN DIHYDRATE 500 MG/1
500 TABLET, FILM COATED ORAL EVERY 24 HOURS
Refills: 0 | Status: DISCONTINUED | OUTPATIENT
Start: 2025-02-11 | End: 2025-02-12

## 2025-02-10 RX ORDER — OSELTAMIVIR PHOSPHATE 75 MG/1
75 CAPSULE ORAL
Refills: 0 | Status: COMPLETED | OUTPATIENT
Start: 2025-02-10 | End: 2025-02-11

## 2025-02-10 RX ORDER — APIXABAN 5 MG/1
2.5 TABLET, FILM COATED ORAL
Refills: 0 | Status: DISCONTINUED | OUTPATIENT
Start: 2025-02-10 | End: 2025-02-10

## 2025-02-10 RX ORDER — ACETAMINOPHEN 160 MG/5ML
650 SUSPENSION ORAL EVERY 6 HOURS
Refills: 0 | Status: DISCONTINUED | OUTPATIENT
Start: 2025-02-10 | End: 2025-02-13

## 2025-02-10 RX ADMIN — ACETAMINOPHEN 400 MILLIGRAM(S): 160 SUSPENSION ORAL at 16:24

## 2025-02-10 RX ADMIN — Medication 1000 MILLILITER(S): at 20:26

## 2025-02-10 RX ADMIN — AZITHROMYCIN DIHYDRATE 255 MILLIGRAM(S): 500 TABLET, FILM COATED ORAL at 17:05

## 2025-02-10 RX ADMIN — ACETAMINOPHEN 1000 MILLIGRAM(S): 160 SUSPENSION ORAL at 20:19

## 2025-02-10 RX ADMIN — CEFTRIAXONE 100 MILLIGRAM(S): 250 INJECTION, POWDER, FOR SOLUTION INTRAMUSCULAR; INTRAVENOUS at 16:24

## 2025-02-10 RX ADMIN — ROSUVASTATIN CALCIUM 5 MILLIGRAM(S): 10 TABLET, FILM COATED ORAL at 22:25

## 2025-02-10 RX ADMIN — Medication 1000 MILLILITER(S): at 17:05

## 2025-02-10 RX ADMIN — IPRATROPIUM BROMIDE AND ALBUTEROL SULFATE 3 MILLILITER(S): .5; 2.5 SOLUTION RESPIRATORY (INHALATION) at 20:26

## 2025-02-10 RX ADMIN — OSELTAMIVIR PHOSPHATE 75 MILLIGRAM(S): 75 CAPSULE ORAL at 20:26

## 2025-02-10 RX ADMIN — AZITHROMYCIN DIHYDRATE 500 MILLIGRAM(S): 500 TABLET, FILM COATED ORAL at 20:20

## 2025-02-10 RX ADMIN — Medication 1000 MILLILITER(S): at 16:24

## 2025-02-10 RX ADMIN — PYRIDOSTIGMINE BROMIDE 60 MILLIGRAM(S): 60 SOLUTION ORAL at 22:26

## 2025-02-10 NOTE — H&P ADULT - PROBLEM SELECTOR PLAN 10
h/o b/l leg edema   +1 edema above the edema, trace pit edema below the knee  home med: lasix eod    Need formal med recc for dosage and frequency

## 2025-02-10 NOTE — H&P ADULT - ASSESSMENT
Patient is an 89 year old male with a PMH of Myasthenia Gravis, HLD, Atrial Fibrillation with a recent admission to the [facility] ED for a fall with head strike presenting to the ED with symptoms of wet cough, runny nose, and altered mental status, found to Influenza A positive with superimposed bacterial pneumonia, now on Ceftriaxone and Azithromycin for CAP.

## 2025-02-10 NOTE — H&P ADULT - NSHPSOCIALHISTORY_GEN_ALL_CORE
Patient recently retired from the real estate business.     He lives with his wife in [place], and his children visit him often    Patient does not drink alcohol.    He used to smoke piped tobacco, but has not for over 25 years.

## 2025-02-10 NOTE — H&P ADULT - NSICDXPASTMEDICALHX_GEN_ALL_CORE_FT
PAST MEDICAL HISTORY:  Atrial fibrillation     Fall at home     HLD (hyperlipidemia)     Prophylactic measure

## 2025-02-10 NOTE — ED PROVIDER NOTE - PHYSICAL EXAMINATION
VITAL SIGNS: I have reviewed nursing notes and confirm.  CONSTITUTIONAL: weak appearing  SKIN: ecchymoses to right side of face from prior fall  HEAD: Normocephalic; atraumatic.  EYES: PERRL, EOM intact; conjunctiva and sclera clear.  ENT: No nasal discharge; airway clear.  NECK: Supple; non tender.  CARD: S1, S2 normal; no murmurs, gallops, or rubs. Regular rate and rhythm.  RESP: crackles right side  ABD: Normal bowel sounds; soft; non-distended; non-tender; no hepatosplenomegaly.  EXT: Normal ROM. No clubbing, cyanosis or edema.  LYMPH: No acute cervical adenopathy.  NEURO: generally weak, but responsive, mildly confused, moving all ext  PSYCH: Cooperative, appropriate.

## 2025-02-10 NOTE — H&P ADULT - PROBLEM SELECTOR PLAN 4
Patient tested positive for Influenza A at Urgent care on 2/6. Has been taking Tamiflu since 2/6. Should be completing 5  day course by today.   -Tamiflu 75 mg bid

## 2025-02-10 NOTE — H&P ADULT - PROBLEM SELECTOR PLAN 3
2/10 Chest X-ray as follows: There is increased opacity in the right mid and lower lung zones, suggesting the possibility of multifocal pneumonia. A small right-sided pleural effusion may be present.    Patient most likely has a superimposed bacterial pneumonia and Influenza A    -f/u Procalcitonin  -f/u Legionella and Strep Pneumo urine antigen   -Ceftriaxone 1g qd for 5 days   -Azithromycin 500 mg wd for 3 days   -Duoneb q6hrs  -Incentive Spirometry 2/10 Chest X-ray as follows: There is increased opacity in the right mid and lower lung zones, suggesting the possibility of multifocal pneumonia. A small right-sided pleural effusion may be present.    Patient most likely has a superimposed bacterial pneumonia and Influenza A    -f/u Procalcitonin  -f/u Legionella and Strep Pneumo urine antigen   -Ceftriaxone 1g qd for 5 days   -Azithromycin 500 mg wd for 3 days   -Duoneb q6hrs  -Incentive Spirometry  -650 mg Tylenol q6hrs PRN fever or pain

## 2025-02-10 NOTE — H&P ADULT - TIME BILLING
Bedside exam and interview. Reviewed vitals, labs. Discussed patient's plan of care with housestaff. Documentation of encounter. Excludes teaching time and/or separately reported services.

## 2025-02-10 NOTE — H&P ADULT - HISTORY OF PRESENT ILLNESS
HPI:      AMS x 2 hours. pt had episode of shaking and then became AOx0. pt now AOx2 and lethargic. Pt was diagnosed with flu on Thursday and fell Thursday and was seen at [facility] ED. Pt presents with bruising to R side of face. Pt on AC. Pt not following commands. Unable to complete BEFAST neg.  altered mental status      ED Course-  Vitals:BP: 141/82 mm Hg, HR: 98/min, RR: 20/min, Temp: 98.1°F (36.7°C, oral), SpO2: 86% on room air, Temp at ED Arrival: 36.7°C.  Labs:Lactate 2.7>>>>1.4, Influenza A positive, Glucose 183,   Imaging: Chest xray: : Again noted evaluation of the heart size, mediastinal and   hilar contours due to the rotated portable technique and low lung   volumes. There is persistent left hemidiaphragm elevation. Increased   opacity over the right mid and lower lung zones and a multifocal   pneumonia cannot be excluded.  Consults:  Interventions: Ofirmev 1g x 1, Azithromycin 500 mg x 1, Ceftriaxone 1 g x 1, NS 1 L bolus x 2     Vital Signs Last 24 Hrs  T(C): 36.7 (10 Feb 2025 15:39), Max: 36.7 (10 Feb 2025 15:39)  T(F): 98.1 (10 Feb 2025 15:39), Max: 98.1 (10 Feb 2025 15:39)  HR: 98 (10 Feb 2025 15:39) (98 - 98)  BP: 141/82 (10 Feb 2025 15:39) (141/82 - 141/82)  BP(mean): --  RR: 20 (10 Feb 2025 15:39) (20 - 20)  SpO2: 86% (10 Feb 2025 15:39) (86% - 86%)    Parameters below as of 10 Feb 2025 15:39  Patient On (Oxygen Delivery Method): room air       HPI:    Patient is an 89 year old male with a PMH of Myasthenia Gravis, HLD, Atrial Fibrillation with a recent admission to the [facility] ED for a fall with head strike presenting to the ED with symptoms of wet cough, runny nose, and altered mental status, found to Influenza positive.    Patient's son reports that his daughter was Influenza positive and spent some time around the patient. The patient developed a wet cough, and was frequently blowing his nose, but did not complain of malaise or fever. On 2/6 the patient had a mechanical fall at home with head strike without loss of consciosness. The patient did not have prodromal symptoms prior to the fall, jerking of the limbs, urinary or fecal incontinence,  tongue biting, or a post-ictal period. The patient does not recall how he fell, and remembers falling to the ground and his son coming to help him. The patients family brought him to Urgent care. At the urgent care facility the patient tested positive for Influenza A, and was given a prescription for Tamiflu (which the Patient has been taking, today is 2nd to last day). The patient was hemodynamically stable at the urgent care but was instructed to go to the ED for a CT scan to evaluate for brain bleeds and skull fractures. The patient was subsequently brought to the [facility] ED where CT scans revealed no acute intracranial hemorrhage, facial bone fractures, or cervical spine fractures, but showed chronic small vessel disease, soft tissue contusions/hematomas on the right side of the face, paranasal sinus disease, and degenerative changes in the cervical spine. The patient was released from the ED that same day.    Per the patients wife he continues to have URI symptoms up until today. Today the patient began to have whole body shaking. The patient did not lose consciousness,  experience tongue biting, or have a post-ictal period. Patient was conscious, and lucid the entire time he was shaking and asked for his wife to get him to the bathroom. The patient  had an episode of fecal incontinence on the way to the bathroom, and had a non bloody nonmucinous bowel movement on the toilet. The body shaking continued. The patient slowly began to be less responsive to voice and verbal commands and was brought again to the [facility] ED. In the ED the patient received Ofirmev 1g x 1, Azithromycin 500 mg x 1, Ceftriaxone 1 g x 1, NS 1 L bolus x 2. Whilst in the ED the patient began to become more responsive and went back to his natural self.     The patient endorses a wet cough, sob, nasal evacuation, and chills. Patient had a BM today, is urinating without issues, and has good appetite .     He denies chest pain, palpitations, nausea, vomiting,     Pharmacy  [facility]   [phone]      ED Course-  Vitals:BP: 141/82 mm Hg, HR: 98/min, RR: 20/min, Temp: 98.1°F>>>101.6 (36.7°C, oral), SpO2: 86% on room air, Temp at ED Arrival: 36.7°C.  Labs:Lactate 2.7>>>>1.4, Influenza A positive, Glucose 183,   Imaging: Chest xray: : Again noted evaluation of the heart size, mediastinal and   hilar contours due to the rotated portable technique and low lung   volumes. There is persistent left hemidiaphragm elevation. Increased   opacity over the right mid and lower lung zones and a multifocal   pneumonia cannot be excluded.    Limited evaluation of the heart size, mediastinal and hilar   contours due to the low lung volumes and rotated portable technique.   There may be a small right-sided pleural effusion. No pneumothorax.   Incidental note is made of a sclerotic serpiginous lesion involving the   proximal right humeral metadiaphyseal region which may represent a bone   infarct and/or enchondroma.      Consults: none   Interventions: Ofirmev 1g x 1, Azithromycin 500 mg x 1, Ceftriaxone 1 g x 1, NS 1 L bolus x 2     Vital Signs Last 24 Hrs  T(C): 36.7 (10 Feb 2025 15:39), Max: 36.7 (10 Feb 2025 15:39)  T(F): 98.1 (10 Feb 2025 15:39), Max: 98.1 (10 Feb 2025 15:39)  HR: 98 (10 Feb 2025 15:39) (98 - 98)  BP: 141/82 (10 Feb 2025 15:39) (141/82 - 141/82)  BP(mean): --  RR: 20 (10 Feb 2025 15:39) (20 - 20)  SpO2: 86% (10 Feb 2025 15:39) (86% - 86%)    Parameters below as of 10 Feb 2025 15:39  Patient On (Oxygen Delivery Method): room air

## 2025-02-10 NOTE — ED ADULT NURSE NOTE - CHIEF COMPLAINT QUOTE
AMS x 2 hours. pt had episode of shaking and then became AOx0. pt now AOx2 and lethargic. Pt was diagnosed with flu on Thursday and fell Thursday and was seen at [facility] ED. Pt presents with bruising to R side of face. Pt on AC. Pt not following commands. Unable to complete BEFAST neg.

## 2025-02-10 NOTE — H&P ADULT - NSHPPHYSICALEXAM_GEN_ALL_CORE
PHYSICAL EXAM:    General: NAD  HEENT: MMM  Neck: supple  Cardiovascular: +S1/S2; RRR  Respiratory: CTA B/L; no W/R/R  Gastrointestinal: soft, NT/ND  Extremities: WWP; no edema, clubbing or cyanosis  Vascular: 2+ radial, DP/PT pulses B/L  Neurological: AAOx3; no focal deficits PHYSICAL EXAM:    General: NAD  HEENT: MMM, Red-purple colored Ecchymosis around the right eye, extending into the infraorbital area extending into the  zygomatic arch along the nasiolabial fold in the right marionette line; subconjunctival hemorrhage of the right eye. No vision changes. Ecchymosis not TTP  Neck: supple  Cardiovascular: +S1/S2; irregular rhythm   Respiratory: decreased breath sounds at lung bases b/l, rhonchi and crackles bilaterally   Gastrointestinal: soft, NT/ND  Extremities: WWP; +1 edema above the edema, trace pit edema below the knee  Vascular: 2+ radial, DP/PT pulses B/L  Neurological: AAOx3; no focal deficits

## 2025-02-10 NOTE — H&P ADULT - NSHPLABSRESULTS_GEN_ALL_CORE
LABS:                        15.8   10. )-----------( 165      ( 10 Feb 2025 16:20 )             48.2     02-10    139  |  98  |  19  ----------------------------<  183[H]  3.8   |  27  |  1.02    Ca    10.1      10 Feb 2025 16:20    TPro  6.9  /  Alb  4.0  /  TBili  0.9  /  DBili  x   /  AST  25  /  ALT  23  /  AlkPhos  74  02-10    PT/INR - ( 10 Feb 2025 16:20 )   PT: 15.7 sec;   INR: 1.35          PTT - ( 10 Feb 2025 16:20 )  PTT:31.2 sec  Urinalysis Basic - ( 10 Feb 2025 16:21 )    Color: Yellow / Appearance: Clear / S.016 / pH: x  Gluc: x / Ketone: Trace mg/dL  / Bili: Negative / Urobili: 1.0 mg/dL   Blood: x / Protein: 100 mg/dL / Nitrite: Negative   Leuk Esterase: Negative / RBC: 3 /HPF / WBC 0 /HPF   Sq Epi: x / Non Sq Epi: 1 /HPF / Bacteria: Negative /HPF      CAPILLARY BLOOD GLUCOSE      POCT Blood Glucose.: 185 mg/dL (10 Feb 2025 15:37)      RADIOLOGY & ADDITIONAL TESTS: Reviewed.

## 2025-02-10 NOTE — ED ADULT TRIAGE NOTE - CHIEF COMPLAINT QUOTE
AMS x 2 hours. pt had episode of shaking and then became AOx0. pt now AOx2 and lethargic. Pt was diagnosed with flu on Thursday and fell Thursday and was seen at [facility] ED. Pt presents with bruising to R side of face. Pt on AC. Pt not following commands. Unable to complete BEFAST neg.

## 2025-02-10 NOTE — H&P ADULT - PROBLEM SELECTOR PLAN 5
Patient satted to 86% on room air in the ED. Placed on 3 L NC now satting 92-94%    --Duoneb q6hrs  -Incentive Spirometry  -Wean to room air as tolerated  -Treat infections as above

## 2025-02-10 NOTE — PATIENT PROFILE ADULT - FALL HARM RISK - HARM RISK INTERVENTIONS

## 2025-02-10 NOTE — H&P ADULT - ATTENDING COMMENTS
89 year old male with a PMH of Myasthenia Gravis, HLD, Atrial Fibrillation with a recent admission to the [facility] ED for a fall with head strike presenting to the ED with symptoms of wet cough, runny nose, and altered mental status, found to Influenza A positive with superimposed bacterial pneumonia, now on Ceftriaxone and Azithromycin for CAP.   Pt seen at bedside. in NAD. ROS +productive cough. states he was fully conscious during shaking episode earlier today, felt chills. +febrile on arrival. on PE: AO x3, no FND, no dysmetria, finger to nose intact, 5/5 MS b/l UE/LE, sensation intact, +right facial hematoma, +PERRLA, MMM, no jvd, s1s2 no murmur, CTA b/l lung fields. abd soft, NT, +1 pitting edema LE     Plan   -c/w tamiflu, Ceft/azithro   -f/up blood/sputum cx, procal, strep ag/legionella   -wean O2 as tolerated   -c/w home meds   -asp/fall precautions   -PT/SW

## 2025-02-10 NOTE — H&P ADULT - PROBLEM SELECTOR PLAN 2
RESOLVED     Patient brought to ED due to altered mental status and rigors. Patient became less responsive to voice command and voice. RESOLVED.     Patient brought to ED due to altered mental status and rigors. Patient became less responsive to voice command and voice briefly, now resolved. no FND.     -CTM

## 2025-02-10 NOTE — H&P ADULT - PROBLEM SELECTOR PLAN 6
Mechanical fall at home without LOC with head strike  2/6 CT Head and Spine: CT scans revealed no acute intracranial hemorrhage, facial bone fractures, or cervical spine fractures, but showed chronic small vessel disease, soft tissue contusions/hematomas on the right side of the face, paranasal sinus disease, and degenerative changes in the cervical spine

## 2025-02-10 NOTE — ED ADULT NURSE NOTE - OBJECTIVE STATEMENT
89 y.o male accompanied by family for episode of shaking, ams x0 2hrs PTA. Pt seen at [facility] ED and tested positive for flu 3 days ago. Pt noted with bruising to R side of face from fall 3 days ago. + anticoagulant use. Pt unable to answer triage questions. Pt placed on CCM, IV access established.

## 2025-02-10 NOTE — ED PROVIDER NOTE - CLINICAL SUMMARY MEDICAL DECISION MAKING FREE TEXT BOX
89 y.o m with pmh of a fib on eliquis, mild MG, recent fall four days prior and incidentally found to have flu (also had CTs which were negative in ED) presents to ED with confusion, rigors, chills starting two hours prior to presentation as noted by family.  Family states cough has also worsened and sounds productive.  No known fever as per family.  Pt lethargic and not himself.  Family denies new falls or focal weakness.    VS - 101.5 rectal temp 85% RA O2  Weak appearing, crackles right lung  Septic workup performed  Xray with + pna  Ceft and zithromax given  O2 improved with NC  2 L fluids given but will not give 30cc/kg secondary to age > 65 and pt crackly after fluids given in ED  Lactate initially 2.7 then improved with fluids  Discussed with MAC agrees on admission for pna, sepsis, hypoxia

## 2025-02-10 NOTE — H&P ADULT - PROBLEM SELECTOR PLAN 7
History of atrial fibrillation  Home med: Eliquis 2.5 mg BID    -ctw Home med: Eliquis 2.5 mg BID  -f/u EKG

## 2025-02-10 NOTE — ED ADULT NURSE NOTE - NSFALLHARMRISKINTERV_ED_ALL_ED
Assistance OOB with selected safe patient handling equipment if applicable/Assistance with ambulation/Communicate risk of Fall with Harm to all staff, patient, and family/Monitor gait and stability/Monitor for mental status changes and reorient to person, place, and time, as needed/Move patient closer to nursing station/within visual sight of ED staff/Provide patient with walking aids/Provide visual cue: red socks, yellow wristband, yellow gown, etc/Reinforce activity limits and safety measures with patient and family/Toileting schedule using arm’s reach rule for commode and bathroom/Use of alarms - bed, stretcher, chair and/or video monitoring/Bed in lowest position, wheels locked, appropriate side rails in place/Call bell, personal items and telephone in reach/Instruct patient to call for assistance before getting out of bed/chair/stretcher/Non-slip footwear applied when patient is off stretcher/[place] to call system/Physically safe environment - no spills, clutter or unnecessary equipment/Purposeful Proactive Rounding/Room/bathroom lighting operational, light cord in reach

## 2025-02-10 NOTE — ED PROVIDER NOTE - OBJECTIVE STATEMENT
89 y.o m with pmh of a fib on eliquis, mild MG, recent fall four days prior and incidentally found to have flu (also had CTs which were negative in ED) presents to ED with confusion, rigors, chills starting two hours prior to presentation as noted by family.  Family states cough has also worsened and sounds productive.  No known fever as per family.  Pt lethargic and not himself.  Family denies new falls or focal weakness.

## 2025-02-11 LAB
ADD ON TEST-SPECIMEN IN LAB: SIGNIFICANT CHANGE UP
ALBUMIN SERPL ELPH-MCNC: 3.2 G/DL — LOW (ref 3.3–5)
ALP SERPL-CCNC: 60 U/L — SIGNIFICANT CHANGE UP (ref 40–120)
ALT FLD-CCNC: 16 U/L — SIGNIFICANT CHANGE UP (ref 10–45)
ANION GAP SERPL CALC-SCNC: 11 MMOL/L — SIGNIFICANT CHANGE UP (ref 5–17)
AST SERPL-CCNC: 17 U/L — SIGNIFICANT CHANGE UP (ref 10–40)
BASOPHILS # BLD AUTO: 0 K/UL — SIGNIFICANT CHANGE UP (ref 0–0.2)
BASOPHILS NFR BLD AUTO: 0 % — SIGNIFICANT CHANGE UP (ref 0–2)
BILIRUB SERPL-MCNC: 0.7 MG/DL — SIGNIFICANT CHANGE UP (ref 0.2–1.2)
BUN SERPL-MCNC: 20 MG/DL — SIGNIFICANT CHANGE UP (ref 7–23)
CALCIUM SERPL-MCNC: 9.3 MG/DL — SIGNIFICANT CHANGE UP (ref 8.4–10.5)
CHLORIDE SERPL-SCNC: 103 MMOL/L — SIGNIFICANT CHANGE UP (ref 96–108)
CO2 SERPL-SCNC: 25 MMOL/L — SIGNIFICANT CHANGE UP (ref 22–31)
CREAT SERPL-MCNC: 1.08 MG/DL — SIGNIFICANT CHANGE UP (ref 0.5–1.3)
EGFR: 66 ML/MIN/1.73M2 — SIGNIFICANT CHANGE UP
EOSINOPHIL # BLD AUTO: 0 K/UL — SIGNIFICANT CHANGE UP (ref 0–0.5)
EOSINOPHIL NFR BLD AUTO: 0 % — SIGNIFICANT CHANGE UP (ref 0–6)
GLUCOSE SERPL-MCNC: 116 MG/DL — HIGH (ref 70–99)
HCT VFR BLD CALC: 43.3 % — SIGNIFICANT CHANGE UP (ref 39–50)
HGB BLD-MCNC: 13.7 G/DL — SIGNIFICANT CHANGE UP (ref 13–17)
LEGIONELLA AG UR QL: NEGATIVE — SIGNIFICANT CHANGE UP
LYMPHOCYTES # BLD AUTO: 0.7 K/UL — LOW (ref 1–3.3)
LYMPHOCYTES # BLD AUTO: 4.3 % — LOW (ref 13–44)
MAGNESIUM SERPL-MCNC: 1.6 MG/DL — SIGNIFICANT CHANGE UP (ref 1.6–2.6)
MCHC RBC-ENTMCNC: 28.4 PG — SIGNIFICANT CHANGE UP (ref 27–34)
MCHC RBC-ENTMCNC: 31.6 G/DL — LOW (ref 32–36)
MCV RBC AUTO: 89.8 FL — SIGNIFICANT CHANGE UP (ref 80–100)
MONOCYTES # BLD AUTO: 0.99 K/UL — HIGH (ref 0–0.9)
MONOCYTES NFR BLD AUTO: 6.1 % — SIGNIFICANT CHANGE UP (ref 2–14)
NEUTROPHILS # BLD AUTO: 14.43 K/UL — HIGH (ref 1.8–7.4)
NEUTROPHILS NFR BLD AUTO: 82.6 % — HIGH (ref 43–77)
PHOSPHATE SERPL-MCNC: 3.1 MG/DL — SIGNIFICANT CHANGE UP (ref 2.5–4.5)
PLATELET # BLD AUTO: 137 K/UL — LOW (ref 150–400)
POTASSIUM SERPL-MCNC: 4.1 MMOL/L — SIGNIFICANT CHANGE UP (ref 3.5–5.3)
POTASSIUM SERPL-SCNC: 4.1 MMOL/L — SIGNIFICANT CHANGE UP (ref 3.5–5.3)
PROT SERPL-MCNC: 5.7 G/DL — LOW (ref 6–8.3)
RBC # BLD: 4.82 M/UL — SIGNIFICANT CHANGE UP (ref 4.2–5.8)
RBC # FLD: 15.7 % — HIGH (ref 10.3–14.5)
S PNEUM AG UR QL: NEGATIVE — SIGNIFICANT CHANGE UP
SODIUM SERPL-SCNC: 139 MMOL/L — SIGNIFICANT CHANGE UP (ref 135–145)
WBC # BLD: 16.27 K/UL — HIGH (ref 3.8–10.5)
WBC # FLD AUTO: 16.27 K/UL — HIGH (ref 3.8–10.5)

## 2025-02-11 PROCEDURE — 71046 X-RAY EXAM CHEST 2 VIEWS: CPT | Mod: 26

## 2025-02-11 PROCEDURE — 99233 SBSQ HOSP IP/OBS HIGH 50: CPT | Mod: GC

## 2025-02-11 RX ORDER — ALPRAZOLAM 2 MG
0.5 TABLET ORAL ONCE
Refills: 0 | Status: DISCONTINUED | OUTPATIENT
Start: 2025-02-11 | End: 2025-02-11

## 2025-02-11 RX ORDER — PYRIDOSTIGMINE BROMIDE 60 MG/5ML
120 SOLUTION ORAL EVERY 8 HOURS
Refills: 0 | Status: DISCONTINUED | OUTPATIENT
Start: 2025-02-11 | End: 2025-02-13

## 2025-02-11 RX ORDER — CEFTRIAXONE 250 MG/1
2000 INJECTION, POWDER, FOR SOLUTION INTRAMUSCULAR; INTRAVENOUS EVERY 24 HOURS
Refills: 0 | Status: DISCONTINUED | OUTPATIENT
Start: 2025-02-11 | End: 2025-02-13

## 2025-02-11 RX ADMIN — OSELTAMIVIR PHOSPHATE 75 MILLIGRAM(S): 75 CAPSULE ORAL at 06:37

## 2025-02-11 RX ADMIN — ACETAMINOPHEN 650 MILLIGRAM(S): 160 SUSPENSION ORAL at 01:19

## 2025-02-11 RX ADMIN — ACETAMINOPHEN 650 MILLIGRAM(S): 160 SUSPENSION ORAL at 23:25

## 2025-02-11 RX ADMIN — PYRIDOSTIGMINE BROMIDE 120 MILLIGRAM(S): 60 SOLUTION ORAL at 21:58

## 2025-02-11 RX ADMIN — IPRATROPIUM BROMIDE AND ALBUTEROL SULFATE 3 MILLILITER(S): .5; 2.5 SOLUTION RESPIRATORY (INHALATION) at 23:08

## 2025-02-11 RX ADMIN — PYRIDOSTIGMINE BROMIDE 60 MILLIGRAM(S): 60 SOLUTION ORAL at 06:36

## 2025-02-11 RX ADMIN — AZITHROMYCIN DIHYDRATE 255 MILLIGRAM(S): 500 TABLET, FILM COATED ORAL at 17:43

## 2025-02-11 RX ADMIN — ACETAMINOPHEN 650 MILLIGRAM(S): 160 SUSPENSION ORAL at 22:25

## 2025-02-11 RX ADMIN — Medication 0.5 MILLIGRAM(S): at 00:19

## 2025-02-11 RX ADMIN — CEFTRIAXONE 100 MILLIGRAM(S): 250 INJECTION, POWDER, FOR SOLUTION INTRAMUSCULAR; INTRAVENOUS at 10:23

## 2025-02-11 RX ADMIN — IPRATROPIUM BROMIDE AND ALBUTEROL SULFATE 3 MILLILITER(S): .5; 2.5 SOLUTION RESPIRATORY (INHALATION) at 09:15

## 2025-02-11 RX ADMIN — ACETAMINOPHEN 650 MILLIGRAM(S): 160 SUSPENSION ORAL at 00:19

## 2025-02-11 RX ADMIN — IPRATROPIUM BROMIDE AND ALBUTEROL SULFATE 3 MILLILITER(S): .5; 2.5 SOLUTION RESPIRATORY (INHALATION) at 04:15

## 2025-02-11 RX ADMIN — APIXABAN 2.5 MILLIGRAM(S): 5 TABLET, FILM COATED ORAL at 06:36

## 2025-02-11 RX ADMIN — OSELTAMIVIR PHOSPHATE 75 MILLIGRAM(S): 75 CAPSULE ORAL at 17:23

## 2025-02-11 RX ADMIN — Medication 0.5 MILLIGRAM(S): at 22:24

## 2025-02-11 RX ADMIN — PYRIDOSTIGMINE BROMIDE 120 MILLIGRAM(S): 60 SOLUTION ORAL at 14:40

## 2025-02-11 RX ADMIN — IPRATROPIUM BROMIDE AND ALBUTEROL SULFATE 3 MILLILITER(S): .5; 2.5 SOLUTION RESPIRATORY (INHALATION) at 17:23

## 2025-02-11 RX ADMIN — ROSUVASTATIN CALCIUM 5 MILLIGRAM(S): 10 TABLET, FILM COATED ORAL at 21:58

## 2025-02-11 RX ADMIN — APIXABAN 2.5 MILLIGRAM(S): 5 TABLET, FILM COATED ORAL at 17:23

## 2025-02-11 NOTE — SWALLOW BEDSIDE ASSESSMENT ADULT - SLP GENERAL OBSERVATIONS
Pt was seen fully awake and alert, HOB fully elevated, on room air. A&Ox3, followed simple directives, and communicated wants/needs. Wife at bedside. Pt denied any swallowing concerns.

## 2025-02-11 NOTE — PHYSICAL THERAPY INITIAL EVALUATION ADULT - PERTINENT HX OF CURRENT PROBLEM, REHAB EVAL
Patient is 89-year-old male with a PMHx of MG, HLD, Afib (on Eliquis) and recent ED visit following a fall who presented with metabolic encephalopathy and acute hypoxic respiratory failure 2/2 severe sepsis 2/2 Influenza A with superimposed bacterial pneumonia.

## 2025-02-11 NOTE — PROGRESS NOTE ADULT - SUBJECTIVE AND OBJECTIVE BOX
CC: Patient is a 89y old  Male who presents with a chief complaint of     INTERVAL EVENTS: HERIBERTO    SUBJECTIVE / INTERVAL HPI: Patient seen and examined at bedside. Patient reports feeling a little bit better. Decreased frequency of his wet cough. Not sob, no fever, no chills. Had a small BM this morning, no dysuria, appetite is fine.     ROS: negative unless otherwise stated above.    VITAL SIGNS:  Vital Signs Last 24 Hrs  T(C): 36.4 (11 Feb 2025 20:26), Max: 36.9 (11 Feb 2025 06:33)  T(F): 97.5 (11 Feb 2025 20:26), Max: 98.4 (11 Feb 2025 06:33)  HR: 67 (11 Feb 2025 20:26) (67 - 79)  BP: 122/65 (11 Feb 2025 20:26) (115/61 - 127/73)  BP(mean): 79 (11 Feb 2025 11:36) (79 - 79)  RR: 18 (11 Feb 2025 20:26) (18 - 19)  SpO2: 96% (11 Feb 2025 20:26) (90% - 96%)    Parameters below as of 11 Feb 2025 20:26  Patient On (Oxygen Delivery Method): nasal cannula  O2 Flow (L/min): 2          PHYSICAL EXAM:    General: NAD  HEENT: MMM  Neck: supple  Cardiovascular: +S1/S2; RRR  Respiratory: B/L rhonchi   Gastrointestinal: soft, NT/ND  Extremities: WWP; trace pitting edema above the knees  Vascular: 2+ radial, DP/PT pulses B/L  Neurological: AAOx3; no focal deficits    MEDICATIONS:  MEDICATIONS  (STANDING):  albuterol/ipratropium for Nebulization 3 milliLiter(s) Nebulizer every 6 hours  apixaban 2.5 milliGRAM(s) Oral two times a day  azithromycin  IVPB 500 milliGRAM(s) IV Intermittent every 24 hours  cefTRIAXone   IVPB 2000 milliGRAM(s) IV Intermittent every 24 hours  hydrochlorothiazide 25 milliGRAM(s) Oral daily  pyridostigmine 120 milliGRAM(s) Oral every 8 hours  rosuvastatin 5 milliGRAM(s) Oral at bedtime    MEDICATIONS  (PRN):  acetaminophen     Tablet .. 650 milliGRAM(s) Oral every 6 hours PRN Temp greater or equal to 38C (100.4F), Mild Pain (1 - 3)  aluminum hydroxide/magnesium hydroxide/simethicone Suspension 30 milliLiter(s) Oral every 4 hours PRN Dyspepsia  melatonin 3 milliGRAM(s) Oral at bedtime PRN Insomnia  ondansetron Injectable 4 milliGRAM(s) IV Push every 8 hours PRN Nausea and/or Vomiting      ALLERGIES:  Allergies    No Known Allergies    Intolerances        LABS:                        13.7   16.27 )-----------( 137      ( 11 Feb 2025 09:11 )             43.3     02-11    139  |  103  |  20  ----------------------------<  116[H]  4.1   |  25  |  1.08    Ca    9.3      11 Feb 2025 09:11  Phos  3.1     02-11  Mg     1.6     02-11    TPro  5.7[L]  /  Alb  3.2[L]  /  TBili  0.7  /  DBili  x   /  AST  17  /  ALT  16  /  AlkPhos  60  02-11    PT/INR - ( 10 Feb 2025 16:20 )   PT: 15.7 sec;   INR: 1.35          PTT - ( 10 Feb 2025 16:20 )  PTT:31.2 sec  Urinalysis Basic - ( 11 Feb 2025 09:11 )    Color: x / Appearance: x / SG: x / pH: x  Gluc: 116 mg/dL / Ketone: x  / Bili: x / Urobili: x   Blood: x / Protein: x / Nitrite: x   Leuk Esterase: x / RBC: x / WBC x   Sq Epi: x / Non Sq Epi: x / Bacteria: x      CAPILLARY BLOOD GLUCOSE      POCT Blood Glucose.: 185 mg/dL (10 Feb 2025 15:37)      RADIOLOGY & ADDITIONAL TESTS: Reviewed.

## 2025-02-11 NOTE — PHYSICAL THERAPY INITIAL EVALUATION ADULT - ADDITIONAL COMMENTS
Patient lives with wife in private home +5FoS within. PTA, pt was independent with all ADLs and functional mobility, with the use of a straight cane. Patient owns a rolling walker.

## 2025-02-11 NOTE — SWALLOW BEDSIDE ASSESSMENT ADULT - SLP PERTINENT HISTORY OF CURRENT PROBLEM
PMH of Myasthenia Gravis, HLD, Atrial Fibrillation with a recent admission to the [facility] ED for a fall with head strike presenting to the ED with symptoms of wet cough, runny nose, and altered mental status, found to Influenza A positive with superimposed bacterial pneumonia, now on antibiotics.

## 2025-02-11 NOTE — PHYSICAL THERAPY INITIAL EVALUATION ADULT - GENERAL OBSERVATIONS, REHAB EVAL
Patient encountered semi-supine in bed in no apparent distress, +heplock +pulseOx +NC @2L/min. Agreeable to PT chapin. Wife present at bedside. Patient encountered semi-supine in bed in no apparent distress, +heplock +pulseOx +NC @2L/min +ecchymosis around R eye. Agreeable to PT chapin. Wife present at bedside.

## 2025-02-11 NOTE — PHYSICAL THERAPY INITIAL EVALUATION ADULT - PHYSICAL ASSIST/NONPHYSICAL ASSIST: GAIT, REHAB EVAL
cues for improved posture, positioning within AD/verbal cues/nonverbal cues (demo/gestures)/1 person assist

## 2025-02-11 NOTE — PHYSICAL THERAPY INITIAL EVALUATION ADULT - ASSISTIVE DEVICE FOR TRANSFER: GAIT, REHAB EVAL
of note, patient amb on NC @2L/min @ 95% --> removed NC (per RN) , patient desatted to 88% --> placed NC back on patient, VSS (MD Pascual mcdonald)/louis stokes

## 2025-02-11 NOTE — PHYSICAL THERAPY INITIAL EVALUATION ADULT - IMPAIRMENTS CONTRIBUTING TO GAIT DEVIATIONS, PT EVAL
fairly steady with no acute LOB noted, forward flexed posture/impaired postural control/decreased strength

## 2025-02-11 NOTE — SWALLOW BEDSIDE ASSESSMENT ADULT - SWALLOW EVAL: DIAGNOSIS
Seemingly functional oropharyngeal swallow. No clinical indicators of penetration/aspiration and pharyngeal inefficiency noted. Low concern for oropharyngeal dysfunction based on clinical presentation. If further concern for aspiration related pulmonary changes and/or to r/o silent aspiration, recommend a modified barium swallow study. *** Seemingly functional oropharyngeal swallow. No clinical indicators of penetration/aspiration and pharyngeal inefficiency noted. Low concern for oropharyngeal dysfunction based on clinical presentation. If further concern for aspiration related pulmonary changes and/or to r/o silent aspiration, recommend a modified barium swallow study. Please re-consult speech pathology if medical team/pt wish to pursue further w/u.

## 2025-02-11 NOTE — PHYSICAL THERAPY INITIAL EVALUATION ADULT - MANUAL MUSCLE TESTING RESULTS, REHAB EVAL
R shoulder flexion 3+/5, L shoulder flexion @ 4+/5, b/l elbow flexion/ext 5/5,  5/5. b/l hip flexion @ 4-/5, b/l knee flex/ext @ 5/5

## 2025-02-12 ENCOUNTER — TRANSCRIPTION ENCOUNTER (OUTPATIENT)
Age: 89
End: 2025-02-12

## 2025-02-12 LAB
ADD ON TEST-SPECIMEN IN LAB: SIGNIFICANT CHANGE UP
ALBUMIN SERPL ELPH-MCNC: 3 G/DL — LOW (ref 3.3–5)
ALP SERPL-CCNC: 62 U/L — SIGNIFICANT CHANGE UP (ref 40–120)
ALT FLD-CCNC: 19 U/L — SIGNIFICANT CHANGE UP (ref 10–45)
ANION GAP SERPL CALC-SCNC: 10 MMOL/L — SIGNIFICANT CHANGE UP (ref 5–17)
AST SERPL-CCNC: 19 U/L — SIGNIFICANT CHANGE UP (ref 10–40)
BASOPHILS # BLD AUTO: 0 K/UL — SIGNIFICANT CHANGE UP (ref 0–0.2)
BASOPHILS NFR BLD AUTO: 0 % — SIGNIFICANT CHANGE UP (ref 0–2)
BILIRUB SERPL-MCNC: 0.5 MG/DL — SIGNIFICANT CHANGE UP (ref 0.2–1.2)
BUN SERPL-MCNC: 21 MG/DL — SIGNIFICANT CHANGE UP (ref 7–23)
CALCIUM SERPL-MCNC: 9.8 MG/DL — SIGNIFICANT CHANGE UP (ref 8.4–10.5)
CHLORIDE SERPL-SCNC: 99 MMOL/L — SIGNIFICANT CHANGE UP (ref 96–108)
CO2 SERPL-SCNC: 27 MMOL/L — SIGNIFICANT CHANGE UP (ref 22–31)
CREAT SERPL-MCNC: 1.14 MG/DL — SIGNIFICANT CHANGE UP (ref 0.5–1.3)
EGFR: 62 ML/MIN/1.73M2 — SIGNIFICANT CHANGE UP
EOSINOPHIL # BLD AUTO: 0.22 K/UL — SIGNIFICANT CHANGE UP (ref 0–0.5)
EOSINOPHIL NFR BLD AUTO: 1.7 % — SIGNIFICANT CHANGE UP (ref 0–6)
GLUCOSE SERPL-MCNC: 105 MG/DL — HIGH (ref 70–99)
HCT VFR BLD CALC: 40.6 % — SIGNIFICANT CHANGE UP (ref 39–50)
HGB BLD-MCNC: 12.7 G/DL — LOW (ref 13–17)
LYMPHOCYTES # BLD AUTO: 0.66 K/UL — LOW (ref 1–3.3)
LYMPHOCYTES # BLD AUTO: 5.2 % — LOW (ref 13–44)
MAGNESIUM SERPL-MCNC: 2 MG/DL — SIGNIFICANT CHANGE UP (ref 1.6–2.6)
MCHC RBC-ENTMCNC: 28.2 PG — SIGNIFICANT CHANGE UP (ref 27–34)
MCHC RBC-ENTMCNC: 31.3 G/DL — LOW (ref 32–36)
MCV RBC AUTO: 90.2 FL — SIGNIFICANT CHANGE UP (ref 80–100)
MONOCYTES # BLD AUTO: 0.54 K/UL — SIGNIFICANT CHANGE UP (ref 0–0.9)
MONOCYTES NFR BLD AUTO: 4.3 % — SIGNIFICANT CHANGE UP (ref 2–14)
NEUTROPHILS # BLD AUTO: 11.23 K/UL — HIGH (ref 1.8–7.4)
NEUTROPHILS NFR BLD AUTO: 88.8 % — HIGH (ref 43–77)
PHOSPHATE SERPL-MCNC: 2.7 MG/DL — SIGNIFICANT CHANGE UP (ref 2.5–4.5)
PLATELET # BLD AUTO: 144 K/UL — LOW (ref 150–400)
POTASSIUM SERPL-MCNC: 3.6 MMOL/L — SIGNIFICANT CHANGE UP (ref 3.5–5.3)
POTASSIUM SERPL-SCNC: 3.6 MMOL/L — SIGNIFICANT CHANGE UP (ref 3.5–5.3)
PROT SERPL-MCNC: 6 G/DL — SIGNIFICANT CHANGE UP (ref 6–8.3)
RBC # BLD: 4.5 M/UL — SIGNIFICANT CHANGE UP (ref 4.2–5.8)
RBC # FLD: 15.9 % — HIGH (ref 10.3–14.5)
SODIUM SERPL-SCNC: 136 MMOL/L — SIGNIFICANT CHANGE UP (ref 135–145)
WBC # BLD: 12.65 K/UL — HIGH (ref 3.8–10.5)
WBC # FLD AUTO: 12.65 K/UL — HIGH (ref 3.8–10.5)

## 2025-02-12 PROCEDURE — 99233 SBSQ HOSP IP/OBS HIGH 50: CPT | Mod: GC

## 2025-02-12 RX ORDER — HYDROCHLOROTHIAZIDE 50 MG
1 TABLET ORAL
Qty: 0 | Refills: 0 | DISCHARGE
Start: 2025-02-12

## 2025-02-12 RX ORDER — POLYETHYLENE GLYCOL 3350 17 G/17G
17 POWDER, FOR SOLUTION ORAL ONCE
Refills: 0 | Status: DISCONTINUED | OUTPATIENT
Start: 2025-02-12 | End: 2025-02-13

## 2025-02-12 RX ORDER — SENNOSIDES 8.6 MG
2 TABLET ORAL AT BEDTIME
Refills: 0 | Status: DISCONTINUED | OUTPATIENT
Start: 2025-02-12 | End: 2025-02-13

## 2025-02-12 RX ORDER — CEFPODOXIME PROXETIL 200 MG
1 TABLET ORAL
Qty: 4 | Refills: 0
Start: 2025-02-12 | End: 2025-02-13

## 2025-02-12 RX ORDER — ALPRAZOLAM 2 MG
0.5 TABLET ORAL AT BEDTIME
Refills: 0 | Status: DISCONTINUED | OUTPATIENT
Start: 2025-02-12 | End: 2025-02-13

## 2025-02-12 RX ADMIN — Medication 600 MILLIGRAM(S): at 18:24

## 2025-02-12 RX ADMIN — IPRATROPIUM BROMIDE AND ALBUTEROL SULFATE 3 MILLILITER(S): .5; 2.5 SOLUTION RESPIRATORY (INHALATION) at 06:03

## 2025-02-12 RX ADMIN — PYRIDOSTIGMINE BROMIDE 120 MILLIGRAM(S): 60 SOLUTION ORAL at 13:06

## 2025-02-12 RX ADMIN — Medication 2 TABLET(S): at 21:26

## 2025-02-12 RX ADMIN — CEFTRIAXONE 100 MILLIGRAM(S): 250 INJECTION, POWDER, FOR SOLUTION INTRAMUSCULAR; INTRAVENOUS at 09:19

## 2025-02-12 RX ADMIN — Medication 0.5 MILLIGRAM(S): at 21:26

## 2025-02-12 RX ADMIN — PYRIDOSTIGMINE BROMIDE 120 MILLIGRAM(S): 60 SOLUTION ORAL at 06:03

## 2025-02-12 RX ADMIN — ACETAMINOPHEN 650 MILLIGRAM(S): 160 SUSPENSION ORAL at 22:26

## 2025-02-12 RX ADMIN — IPRATROPIUM BROMIDE AND ALBUTEROL SULFATE 3 MILLILITER(S): .5; 2.5 SOLUTION RESPIRATORY (INHALATION) at 18:24

## 2025-02-12 RX ADMIN — APIXABAN 2.5 MILLIGRAM(S): 5 TABLET, FILM COATED ORAL at 18:24

## 2025-02-12 RX ADMIN — ACETAMINOPHEN 650 MILLIGRAM(S): 160 SUSPENSION ORAL at 21:26

## 2025-02-12 RX ADMIN — ROSUVASTATIN CALCIUM 5 MILLIGRAM(S): 10 TABLET, FILM COATED ORAL at 21:26

## 2025-02-12 RX ADMIN — APIXABAN 2.5 MILLIGRAM(S): 5 TABLET, FILM COATED ORAL at 06:03

## 2025-02-12 RX ADMIN — IPRATROPIUM BROMIDE AND ALBUTEROL SULFATE 3 MILLILITER(S): .5; 2.5 SOLUTION RESPIRATORY (INHALATION) at 13:05

## 2025-02-12 RX ADMIN — IPRATROPIUM BROMIDE AND ALBUTEROL SULFATE 3 MILLILITER(S): .5; 2.5 SOLUTION RESPIRATORY (INHALATION) at 23:04

## 2025-02-12 RX ADMIN — PYRIDOSTIGMINE BROMIDE 120 MILLIGRAM(S): 60 SOLUTION ORAL at 21:26

## 2025-02-12 NOTE — OCCUPATIONAL THERAPY INITIAL EVALUATION ADULT - DIAGNOSIS, OT EVAL
Pt presents to [facility] 2/2 mechanical fall with head strike at home found to have influenza A + bacterial pneumonia. OT consulted 2/2 deconditioning and impaired balance resulting

## 2025-02-12 NOTE — PROGRESS NOTE ADULT - PROBLEM SELECTOR PLAN 10
h/o b/l leg edema   +1 edema above the edema, trace pit edema below the knee  home med: lasix eod    Need formal med recc for dosage and frequency
h/o b/l leg edema   +1 edema above the edema, trace pit edema below the knee  home med: lasix eod    Need formal med recc for dosage and frequency

## 2025-02-12 NOTE — DISCHARGE NOTE PROVIDER - ATTENDING DISCHARGE PHYSICAL EXAMINATION:
Gen: NAD, resting in chair  HEENT: significant ecchymosis around right eye with conjunctival hemorrhage, EOMI, PERRL, no scleral icterus  CV: normal S1 and S2  Lungs: CTABL, normal respiratory effort on RA  Ab: soft, NT, ND, normal BS  Ext: no LE edema   Neuro: A&O x 3, no focal deficits     89-year-old male with a PMHx of MG, HLD, Afib (on Eliquis) and recent ED visit following a fall who presented with metabolic encephalopathy and acute hypoxic respiratory failure 2/2 severe sepsis 2/2 Influenza A with superimposed bacterial pneumonia.   Passed ambulatory pulse ox.      Plan:      -completed 5-day course of Tamiflu that was initiated as an outpatient     -transition from CTX to Cefpodoxime upon discharge to complete course   -PT recommends home PT     Rest of plan as outlined above.

## 2025-02-12 NOTE — CONSULT NOTE ADULT - SUBJECTIVE AND OBJECTIVE BOX
Patient is a 89y old  Male who presents with a chief complaint of     HPI:  HPI:    Patient is an 89 year old male with a PMH of Myasthenia Gravis, HLD, Atrial Fibrillation with a recent admission to the [facility] ED for a fall with head strike presenting to the ED with symptoms of wet cough, runny nose, and altered mental status, found to Influenza positive.    Patient's son reports that his daughter was Influenza positive and spent some time around the patient. The patient developed a wet cough, and was frequently blowing his nose, but did not complain of malaise or fever. On 2/6 the patient had a mechanical fall at home with head strike without loss of consciosness. The patient did not have prodromal symptoms prior to the fall, jerking of the limbs, urinary or fecal incontinence,  tongue biting, or a post-ictal period. The patient does not recall how he fell, and remembers falling to the ground and his son coming to help him. The patients family brought him to Urgent care. At the urgent care facility the patient tested positive for Influenza A, and was given a prescription for Tamiflu (which the Patient has been taking, today is 2nd to last day). The patient was hemodynamically stable at the urgent care but was instructed to go to the ED for a CT scan to evaluate for brain bleeds and skull fractures. The patient was subsequently brought to the [facility] ED where CT scans revealed no acute intracranial hemorrhage, facial bone fractures, or cervical spine fractures, but showed chronic small vessel disease, soft tissue contusions/hematomas on the right side of the face, paranasal sinus disease, and degenerative changes in the cervical spine. The patient was released from the ED that same day.    Per the patients wife he continues to have URI symptoms up until today. Today the patient began to have whole body shaking. The patient did not lose consciousness,  experience tongue biting, or have a post-ictal period. Patient was conscious, and lucid the entire time he was shaking and asked for his wife to get him to the bathroom. The patient  had an episode of fecal incontinence on the way to the bathroom, and had a non bloody nonmucinous bowel movement on the toilet. The body shaking continued. The patient slowly began to be less responsive to voice and verbal commands and was brought again to the [facility] ED. In thee ED the patient received Ofirmev 1g x 1, Azithromycin 500 mg x 1, Ceftriaxone 1 g x 1, NS 1 L bolus x 2. Whilst in the ED the patient began to become more responsive and went back to his natural self.     The patient endorses a wet cough, sob, nasal evacuation, and chills. Patient had a BM today, is urinating without issues, and has good appetite .     He denies chest pain, palpitations, nausea, vomiting,     Pharmacy  [facility]   [phone]      ED Course-  Vitals:BP: 141/82 mm Hg, HR: 98/min, RR: 20/min, Temp: 98.1°F>>>101.6 (36.7°C, oral), SpO2: 86% on room air, Temp at ED Arrival: 36.7°C.  Labs:Lactate 2.7>>>>1.4, Influenza A positive, Glucose 183,   Imaging: Chest xray: : Again noted evaluation of the heart size, mediastinal and   hilar contours due to the rotated portable technique and low lung   volumes. There is persistent left hemidiaphragm elevation. Increased   opacity over the right mid and lower lung zones and a multifocal   pneumonia cannot be excluded.    Limited evaluation of the heart size, mediastinal and hilar   contours due to the low lung volumes and rotated portable technique.   There may be a small right-sided pleural effusion. No pneumothorax.   Incidental note is made of a sclerotic serpiginous lesion involving the   proximal right humeral metadiaphyseal region which may represent a bone   infarct and/or enchondroma.      Consults: none   Interventions: Ofirmev 1g x 1, Azithromycin 500 mg x 1, Ceftriaxone 1 g x 1, NS 1 L bolus x 2     Vital Signs Last 24 Hrs  T(C): 36.7 (10 Feb 2025 15:39), Max: 36.7 (10 Feb 2025 15:39)  T(F): 98.1 (10 Feb 2025 15:39), Max: 98.1 (10 Feb 2025 15:39)  HR: 98 (10 Feb 2025 15:39) (98 - 98)  BP: 141/82 (10 Feb 2025 15:39) (141/82 - 141/82)  BP(mean): --  RR: 20 (10 Feb 2025 15:39) (20 - 20)  SpO2: 86% (10 Feb 2025 15:39) (86% - 86%)    Parameters below as of 10 Feb 2025 15:39  Patient On (Oxygen Delivery Method): room air       (10 Feb 2025 17:43)    PAST MEDICAL & SURGICAL HISTORY:  Atrial fibrillation      Fall at home      Prophylactic measure      HLD (hyperlipidemia)        MEDICATIONS  (STANDING):  albuterol/ipratropium for Nebulization 3 milliLiter(s) Nebulizer every 6 hours  apixaban 2.5 milliGRAM(s) Oral two times a day  azithromycin  IVPB 500 milliGRAM(s) IV Intermittent every 24 hours  cefTRIAXone   IVPB 2000 milliGRAM(s) IV Intermittent every 24 hours  hydrochlorothiazide 25 milliGRAM(s) Oral daily  pyridostigmine 120 milliGRAM(s) Oral every 8 hours  rosuvastatin 5 milliGRAM(s) Oral at bedtime    MEDICATIONS  (PRN):  acetaminophen     Tablet .. 650 milliGRAM(s) Oral every 6 hours PRN Temp greater or equal to 38C (100.4F), Mild Pain (1 - 3)  aluminum hydroxide/magnesium hydroxide/simethicone Suspension 30 milliLiter(s) Oral every 4 hours PRN Dyspepsia  melatonin 3 milliGRAM(s) Oral at bedtime PRN Insomnia  ondansetron Injectable 4 milliGRAM(s) IV Push every 8 hours PRN Nausea and/or Vomiting          Home Living Status :  lives alone with his wife in [facility] with [facility]          -  Home Services :  none         Baseline Functional Level Prior to Admission :             - ADL's/ IADL's :  independent          - Ambulatory status prior to admission :   walked with a rolling walker         FAMILY HISTORY:      CBC Full  -  ( 11 Feb 2025 09:11 )  WBC Count : 16.27 K/uL  RBC Count : 4.82 M/uL  Hemoglobin : 13.7 g/dL  Hematocrit : 43.3 %  Platelet Count - Automated : 137 K/uL  Mean Cell Volume : 89.8 fl  Mean Cell Hemoglobin : 28.4 pg  Mean Cell Hemoglobin Concentration : 31.6 g/dL  Auto Neutrophil # : x  Auto Lymphocyte # : x  Auto Monocyte # : x  Auto Eosinophil # : x  Auto Basophil # : x  Auto Neutrophil % : x  Auto Lymphocyte % : x  Auto Monocyte % : x  Auto Eosinophil % : x  Auto Basophil % : x      02-11    139  |  103  |  20  ----------------------------<  116[H]  4.1   |  25  |  1.08    Ca    9.3      11 Feb 2025 09:11  Phos  3.1     02-11  Mg     1.6     02-11    TPro  5.7[L]  /  Alb  3.2[L]  /  TBili  0.7  /  DBili  x   /  AST  17  /  ALT  16  /  AlkPhos  60  02-11      Urinalysis Basic - ( 11 Feb 2025 09:11 )    Color: x / Appearance: x / SG: x / pH: x  Gluc: 116 mg/dL / Ketone: x  / Bili: x / Urobili: x   Blood: x / Protein: x / Nitrite: x   Leuk Esterase: x / RBC: x / WBC x   Sq Epi: x / Non Sq Epi: x / Bacteria: x        Radiology :     < from: CT Head No Cont (02.08.24 @ 15:31) >  ACC: [id number] EXAM:  CT MAXILLOFACIAL   ORDERED BY: CHANTAL CHUN     ACC: [id number] EXAM:  CT BRAIN   ORDERED BY: CHANTAL CHUN     PROCEDURE DATE:  02/08/2024          INTERPRETATION:  PROCEDURE: CT HEAD AND MAXILLOFACIAL    TECHNIQUE: CT HEAD: Multiple contiguous axial CT images of the head were   obtained from the base of the skull to the vertex without the   administration of intravenous contrast. Coronal and sagittal reformatted   images were obtained.    CT MAXILLOFACIAL: A thinsection axial acquisition was obtained through   the facial bones without contrast and reconstructed in the axial,   sagittal and coronal planes.    Clinical information: Nasal trauma status post fall    Comparison: None    FINDINGS:    The ventricles and sulci are within normal limits for patient's stated   age.    No acute intracranial hemorrhage, extra-axial fluid collection, mass   effect or midline shift..    Gray-white matter differentiation is preserved. There are patchy foci of   periventricular and subcortical hypodensities consistent with moderate   chronic microvascular ischemic disease. .    The bones of the calvarium are intact.    There is sclerosis of the left mastoid tip..      Orbital walls: No acute fracture or dislocation    Mandible/TMJ:  No acute fracture or dislocation    Zygomatic arches: No acute fracture or dislocation    Skull base: Intact    Nasal bones: There are bilateral nondisplaced nasal bone fractures. The   bony nasal septum is intact.    Sinuses: No air-fluid levels    Orbital contents: The native lenses have been replaced. There is no   retrobulbar hematoma      IMPRESSION:  CT head: No acute intracranial hemorrhage or calvarial fracture.    CT maxillofacial: Nondisplaced bilateral nasal bone fractures.                Review of Systems : per HPI         Vital Signs Last 24 Hrs  T(C): 36.3 (12 Feb 2025 06:14), Max: 36.6 (11 Feb 2025 11:36)  T(F): 97.3 (12 Feb 2025 06:14), Max: 97.8 (11 Feb 2025 11:36)  HR: 75 (12 Feb 2025 06:14) (67 - 79)  BP: 119/68 (12 Feb 2025 06:14) (115/61 - 127/73)  BP(mean): 79 (11 Feb 2025 11:36) (79 - 79)  RR: 19 (12 Feb 2025 06:14) (18 - 19)  SpO2: 95% (12 Feb 2025 06:14) (90% - 96%)    Parameters below as of 12 Feb 2025 06:14  Patient On (Oxygen Delivery Method): nasal cannula  O2 Flow (L/min): 2          Physical Exam:  on  DROPLET INFL A  isolation ,  89 y o man lying comfortably in semi Winter's position , awake ,  NAD     Head: normocephalic , atraumatic    Eyes: ecchymosis around the right eye, extending into the infraorbital area extending into the  zygomatic arch along the nasiolabial fold in the right marionette line;  , PERRLA , EOMI , no nystagmus , sclera anicteric    ENT / FACE: neg nasal discharge , uvula midline , no oropharyngeal erythema / exudate    Neck: supple , negative JVD , negative carotid bruits , no thyromegaly    Chest: june rhonchi    Cardiovascular: irregular rate and rhythm , neg murmurs / rubs / gallops    Abdomen: soft , non distended , no tenderness to palpation in all 4 quadrants ,  normal bowel sounds     Extremities: 1+ LE edema     Neurologic Exam:     Alert and oriented to person , place     Cranial Nerves:           II:                         pupils equal , round and reactive to light , visual fields intact         III/ IV/VI:             extraocular movements intact , neg nystagmus , neg ptosis        V:                        facial sensation intact , V1-3 normal        VII:                      face symmetric , no droop , normal eye closure and smile        VIII:                     hearing intact to finger rub bilaterally        IX and X:             no hoarseness , gag intact , palate/ uvula rise symmetrically        XI:                       SCM / trapezius strength intact bilateral        XII:                      no tongue deviation    Motor Exam:        > 3+/5 x 4 extremities , without drift     Sensation:         intact to light touch x 4 extremities                            no neglect or extinction on double simultaneous testing    DTR:           biceps/brachioradialis: equal                            patella/ankle: equal          neg Babinski      Coordination:            Finger to Nose:  neg dysmetria bilaterally        Gait:  not tested         PM&R Impression: admitted for fall with head strike presenting to the ED with symptoms of wet cough, runny nose, and altered mental status, found to Influenza A positive with superimposed bacterial pneumonia    - no acute pathology on CT brain imaging     - deconditioned       Recommendations / Plan:       1) Physical / Occupational therapy focusing on therapeutic exercises , equipment evaluation , bed mobility/transfer out of bed evaluation , progressive ambulation with assistive devices prn .    2) Current disposition plan recommendation:  d/c home with home physical therapy

## 2025-02-12 NOTE — DISCHARGE NOTE PROVIDER - NSDCFUADDAPPT_GEN_ALL_CORE_FT
Please call to schedule a skin check appointment with Dr. Isabela Del Cid  [address] [place], [place] [address] [phone]

## 2025-02-12 NOTE — PROGRESS NOTE ADULT - SUBJECTIVE AND OBJECTIVE BOX
CC: Patient is a 89y old  Male who presents with a chief complaint of AHRF 2/2 Flu  (12 Feb 2025 13:51)      INTERVAL EVENTS: HERIBERTO    SUBJECTIVE / INTERVAL HPI: Patient seen and examined at bedside.  Patient reports feeling good. Is coughing less. Not sob on 3 L NC. Denies fever or chills. No issues with urination. Had a bm 2 days ago. Appetite is good.     ROS: negative unless otherwise stated above.    VITAL SIGNS:  Vital Signs Last 24 Hrs  T(C): 36.2 (12 Feb 2025 12:59), Max: 36.4 (11 Feb 2025 20:26)  T(F): 97.2 (12 Feb 2025 12:59), Max: 97.5 (11 Feb 2025 20:26)  HR: 61 (12 Feb 2025 12:59) (59 - 75)  BP: 116/66 (12 Feb 2025 12:59) (116/66 - 127/73)  BP(mean): 81 (12 Feb 2025 12:59) (81 - 81)  RR: 18 (12 Feb 2025 12:59) (17 - 19)  SpO2: 94% (12 Feb 2025 12:59) (84% - 96%)    Parameters below as of 12 Feb 2025 12:59  Patient On (Oxygen Delivery Method): room air            PHYSICAL EXAM:    General: NAD  HEENT: MMM, Red-purple colored Ecchymosis around the right eye, extending into the infraorbital area extending into the  zygomatic arch along the nasiolabial fold in the right marionette line; subconjunctival hemorrhage of the right eye. No vision changes. Ecchymosis not TTP  Neck: supple  Cardiovascular: +S1/S2; irregular rhythm   Respiratory: decreased breath sounds at lung bases bilaterally, rhonchi no longer heard on ascultation b/l   Gastrointestinal: soft, NT/ND  Extremities: WWP, trace pit edema, trace pit edema below the knee  Vascular: 2+ radial, DP/PT pulses B/L    MEDICATIONS:  MEDICATIONS  (STANDING):  albuterol/ipratropium for Nebulization 3 milliLiter(s) Nebulizer every 6 hours  apixaban 2.5 milliGRAM(s) Oral two times a day  cefTRIAXone   IVPB 2000 milliGRAM(s) IV Intermittent every 24 hours  hydrochlorothiazide 25 milliGRAM(s) Oral daily  pyridostigmine 120 milliGRAM(s) Oral every 8 hours  rosuvastatin 5 milliGRAM(s) Oral at bedtime    MEDICATIONS  (PRN):  acetaminophen     Tablet .. 650 milliGRAM(s) Oral every 6 hours PRN Temp greater or equal to 38C (100.4F), Mild Pain (1 - 3)  ALPRAZolam 0.5 milliGRAM(s) Oral at bedtime PRN insomnia  aluminum hydroxide/magnesium hydroxide/simethicone Suspension 30 milliLiter(s) Oral every 4 hours PRN Dyspepsia  ondansetron Injectable 4 milliGRAM(s) IV Push every 8 hours PRN Nausea and/or Vomiting      ALLERGIES:  Allergies    No Known Allergies    Intolerances        LABS:                        12.7   12.65 )-----------( 144      ( 12 Feb 2025 05:30 )             40.6     02-12    136  |  99  |  21  ----------------------------<  105[H]  3.6   |  27  |  1.14    Ca    9.8      12 Feb 2025 05:30  Phos  2.7     02-12  Mg     2.0     02-12    TPro  6.0  /  Alb  3.0[L]  /  TBili  0.5  /  DBili  x   /  AST  19  /  ALT  19  /  AlkPhos  62  02-12    PT/INR - ( 10 Feb 2025 16:20 )   PT: 15.7 sec;   INR: 1.35          PTT - ( 10 Feb 2025 16:20 )  PTT:31.2 sec  Urinalysis Basic - ( 12 Feb 2025 05:30 )    Color: x / Appearance: x / SG: x / pH: x  Gluc: 105 mg/dL / Ketone: x  / Bili: x / Urobili: x   Blood: x / Protein: x / Nitrite: x   Leuk Esterase: x / RBC: x / WBC x   Sq Epi: x / Non Sq Epi: x / Bacteria: x      CAPILLARY BLOOD GLUCOSE      POCT Blood Glucose.: 185 mg/dL (10 Feb 2025 15:37)      RADIOLOGY & ADDITIONAL TESTS: Reviewed.

## 2025-02-12 NOTE — PROGRESS NOTE ADULT - PROBLEM SELECTOR PLAN 11
Fluids: as needed  dvt prophy: Eliquis 2.5 mg bid   electrolytes: as needed  diet:  reg
Fluids: as needed  dvt prophy: Eliquis 2.5 mg bid   electrolytes: as needed  diet:  reg

## 2025-02-12 NOTE — PROGRESS NOTE ADULT - PROBLEM SELECTOR PLAN 1
2/4 SIRS criteria (101.6 F, HR 98, lactate, altered mental status)    -treat infections as below
2/4 SIRS criteria (101.6 F, HR 98, lactate, altered mental status)    -treat infections as below

## 2025-02-12 NOTE — DISCHARGE NOTE PROVIDER - HOSPITAL COURSE
#Discharge: do not delete    89 year old male with a PMH of Myasthenia Gravis, HLD, Atrial Fibrillation with a recent admission to the [facility] ED for a fall with head strike presenting to the ED with symptoms of wet cough, runny nose, and altered mental status, found to Influenza A positive with superimposed bacterial pneumonia, now on Ceftriaxone for CAP coverage.    Hospital course (by problem):     #Community Acquired Pneumonia  The patient is presenting with a wet cough that produces sputum and a runny nose. Their chest x-ray revealed increased opacity in the right middle and lower lung zones, indicating a possible multifocal pneumonia. Additionally, there may be a small pleural effusion on the right side, which means there could be some fluid accumulation around the lungs. Tests for Legionella and Streptococcus pneumoniae have returned negative. The patient has completed a 3-day course of Ceftriaxone, administered at 2 grams once daily.    Discharge Plan: Upon discharge, the patient is prescribed Cefpodoxime at a dose of 200 mg to be taken twice daily.    #AHRF   The patient experienced acute hypoxemic respiratory failure, with an oxygen saturation of 86% on room air in the emergency department. Oxygen therapy was initiated at 3 liters per minute via nasal cannula, improving saturation to 92-94%. Treatment included DuoNeb and incentive spirometry. Pneumonia was managed with ceftriaxone, and the patient was weaned to room air.    Discharge Plan: No further action needed     Patient was discharged to: home    New medications: ceftriaxone, cefpodoxime  Changes to old medications: none   Medications that were stopped: none     Items to follow up as outpatient: Dermatology outpatient visit, PCP follow up, and Pulmonology outpatient follow up    Physical exam at the time of discharge:  General: NAD  HEENT: MMM, Red-purple colored Ecchymosis around the right eye, extending into the infraorbital area extending into the  zygomatic arch along the nasiolabial fold in the right marionette line; subconjunctival hemorrhage of the right eye. No vision changes. Ecchymosis not TTP  Neck: supple  Cardiovascular: +S1/S2; irregular rhythm   Respiratory: decreased breath sounds at lung bases bilaterally, rhonchi no longer heard on ascultation b/l   Gastrointestinal: soft, NT/ND  Extremities: WWP, trace pit edema, trace pit edema below the knee  Vascular: 2+ radial, DP/PT pulses B/L

## 2025-02-12 NOTE — CONSULT NOTE ADULT - ASSESSMENT
{\rtf1\roptio08389\ansi\gxbcprr5848\ftnbj\uc1\deff0  {\fonttbl{\f0 \fnil Segoe UI;}{\f1 \fnil \fcharset0 Segoe UI;}{\f2 \fnil Times New Bao;}}  {\colortbl ;\eky944\ssrya506\ahea299 ;\red0\green0\blue0 ;\red0\green0\acix476 ;\red0\green0\blue0 ;}  {\stylesheet{\f0\fs20 Normal;}{\cs1 Default Paragraph Font;}{\cs2\f0\fs16 Line Number;}{\cs3\f2\fs24\ul\cf3 Hyperlink;}}  {\*\revtbl{Unknown;}}  \muwhhc95375\wkbuwa00164\ngswz8165\qixwo5056\dlwtc2272\wlkvz1254\kvtdvzs255\kacpozr491\nogrowautofit\itvivy979\formshade\nofeaturethrottle1\dntblnsbdb\fet4\aendnotes\aftnnrlc\pgbrdrhead\pgbrdrfoot  \sectd\cahpqx18033\spykfk53681\guttersxn0\urnvfdds8434\hilinmvd1337\pgmyzeez6978\svwaoosz2813\rnayfma793\whuyeog705\sbkpage\pgncont\pgndec  \plain\plain\f0\fs24\ql\plain\f0\fs24\plain\f0\fs20\lboa5348\hich\f0\dbch\f0\loch\f0\fs20\par  I M\par  \par  89 year old male with a PMH of Myasthenia Gravis, HLD, Atrial Fibrillation with a recent admission to the [facility] ED for a fall with head strike presenting to the ED with symptoms of wet cough, runny nose, and altered mental status, found to Influenza A positive   with superimposed bacterial pneumonia, now on Ceftriaxone and Azithromycin for CAP. \par  \par  \plain\f1\fs20\ihlm1247\hich\f1\dbch\f1\loch\f1\cf2\fs20\ul{\field{\*\fldinst HYPERLINK [id number] }{\fldrslt Problem/Plan - 1:}}\plain\f0\fs20\nvjv8810\hich\f0\dbch\f0\loch\f0\fs20\ql\par  \'b7  {\*\bkmkstart xo48051930704}{\*\bkmkend pj36693939853}Problem: {\*\bkmkstart rn74538527153}{\*\bkmkend fm29882989860}Severe sepsis. \par  \'b7  {\*\bkmkstart qw18262824553}{\*\bkmkend gh47516856017}Plan: {\*\bkmkstart zj87069097946}{\*\bkmkend rv29422026197}2/4 SIRS criteria (101.6 F, HR 98, lactate, altered mental status)\par  \par  -treat infections as below.\par  \par  \plain\f1\fs20\xfwl7887\hich\f1\dbch\f1\loch\f1\cf2\fs20\ul{\field{\*\fldinst HYPERLINK [id number] }{\fldrslt Problem/Plan - 2:}}\plain\f0\fs20\hehu6067\hich\f0\dbch\f0\loch\f0\fs20\ql\par  \'b7  {\*\bkmkstart oy32501554123}{\*\bkmkend vv38130839204}Problem: {\*\bkmkstart ya60941917602}{\*\bkmkend io48385594623}Metabolic encephalopathy. \par  \'b7  {\*\bkmkstart yp61499290473}{\*\bkmkend gl23236383918}Plan: {\*\bkmkstart nm35528217862}{\*\bkmkend nd23842502180}RESOLVED. \par  \par  Patient brought to ED due to altered mental status and rigors. Patient became less responsive to voice command and voice briefly, now resolved. no FND. \par  \par  -CTM.\plain\f1\fs20\uaoi2403\hich\f1\dbch\f1\loch\f1\cf2\fs20\strike\plain\f0\fs20\spfw0928\hich\f0\dbch\f0\loch\f0\fs20\par  \par  \plain\f1\fs20\rymn4234\hich\f1\dbch\f1\loch\f1\cf2\fs20\ul{\field{\*\fldinst HYPERLINK [id number] }{\fldrslt Problem/Plan - 3:}}\plain\f0\fs20\ynsw0815\hich\f0\dbch\f0\loch\f0\fs20\ql\par  \'b7  {\*\bkmkstart rz13618365332}{\*\bkmkend wv04536785002}Problem: {\*\bkmkstart sr21877387579}{\*\bkmkend ns99794220733}Pneumonia, community acquired. \par  \'b7  {\*\bkmkstart oj03238160651}{\*\bkmkend oz70443841028}Plan: {\*\bkmkstart qo49189982507}{\*\bkmkend mm31632959531}2/10 Chest X-ray as follows: There is increased opacity in the right mid and lower lung zones, suggesting the possibility of multifocal   pneumonia. A small right-sided pleural effusion may be present.\par  \par  Patient most likely has a superimposed bacterial pneumonia and Influenza A\par  \par  -f/u Procalcitonin\par  -f/u Legionella and Strep Pneumo urine antigen \par  -Ceftriaxone 1g qd for 5 days \par  -Azithromycin 500 mg wd for 3 days \par  -Duoneb q6hrs\par  -Incentive Spirometry\par  -650 mg Tylenol q6hrs PRN fever or pain.\par  \par  \plain\f1\fs20\ozzw4287\hich\f1\dbch\f1\loch\f1\cf2\fs20\ul{\field{\*\fldinst HYPERLINK [id number] }{\fldrslt Problem/Plan - 4:}}\plain\f0\fs20\ygan8491\hich\f0\dbch\f0\loch\f0\fs20\ql\par  \'b7  {\*\bkmkstart jl82883437757}{\*\bkmkend ui73341753558}Problem: {\*\bkmkstart zj19169626755}{\*\bkmkend xr79562851147}Influenza A. \par  \'b7  {\*\bkmkstart zs12210703397}{\*\bkmkend he63237246140}Plan: {\*\bkmkstart ep18535839641}{\*\bkmkend ai44183629853}Patient tested positive for Influenza A at Urgent care on 2/6. Has been taking Tamiflu since 2/6. Should be completing 5  day course   by today. \par  -Tamiflu 75 mg bid.\par  \par  \plain\f1\fs20\joxs0024\hich\f1\dbch\f1\loch\f1\cf2\fs20\ul{\field{\*\fldinst HYPERLINK [id number] }{\fldrslt Problem/Plan - 5:}}\plain\f0\fs20\iule7751\hich\f0\dbch\f0\loch\f0\fs20\ql\par  \'b7  {\*\bkmkstart wv40384800989}{\*\bkmkend wn95992771015}Problem: {\*\bkmkstart do12757169303}{\*\bkmkend jq61355797471}Acute hypoxemic respiratory failure. \par  \'b7  {\*\bkmkstart kw13835647700}{\*\bkmkend pt52754786233}Plan: {\*\bkmkstart qf18240922946}{\*\bkmkend bp52580071631}Patient satted to 86% on room air in the ED. Placed on 3 L NC now satting 92-94%\par  \par  --Duoneb q6hrs\par  -Incentive Spirometry\par  -Wean to room air as tolerated\par  -Treat infections as above.\par  \par  \plain\f1\fs20\ydje5710\hich\f1\dbch\f1\loch\f1\cf2\fs20\ul{\field{\*\fldinst HYPERLINK [id number] }{\fldrslt Problem/Plan - 6:}}\plain\f0\fs20\qita2887\hich\f0\dbch\f0\loch\f0\fs20\ql\par  \'b7  {\*\bkmkstart im10652253474}{\*\bkmkend ye49817142189}Problem: {\*\bkmkstart gu84639903445}{\*\bkmkend sf38804091712}Fall at home. \par  \'b7  {\*\bkmkstart bj34190669189}{\*\bkmkend sd73919118777}Plan: {\*\bkmkstart uk69281434557}{\*\bkmkend gw49357359037}Mechanical fall at home without LOC with head strike\par  2/6 CT Head and Spine: CT scans revealed no acute intracranial hemorrhage, facial bone fractures, or cervical spine fractures, but showed chronic small vessel disease, soft tissue contusions/hematomas on the right side of the face, paranasal sinus disease,   and degenerative changes in the cervical spine.\par  \par  \plain\f1\fs20\rsth6166\hich\f1\dbch\f1\loch\f1\cf2\fs20\ul{\field{\*\fldinst HYPERLINK [id number] }{\fldrslt Problem/Plan - 7:}}\plain\f0\fs20\bktk5055\hich\f0\dbch\f0\loch\f0\fs20\ql\par  \'b7  {\*\bkmkstart kj63340331049}{\*\bkmkend tp28559175887}Problem: {\*\bkmkstart xz97830210974}{\*\bkmkend vc79171625729}Atrial fibrillation. \par  \'b7  {\*\bkmkstart vv93635779109}{\*\bkmkend dv34662006802}Plan: {\*\bkmkstart rq68650777418}{\*\bkmkend er33427812515}History of atrial fibrillation\par  Home med: Eliquis 2.5 mg BID\par  \par  -ctw Home med: Eliquis 2.5 mg BID\par  -f/u EKG.\par  \par  \plain\f1\fs20\gncb0306\hich\f1\dbch\f1\loch\f1\cf2\fs20\ul{\field{\*\fldinst HYPERLINK [id number] }{\fldrslt Problem/Plan - 8:}}\plain\f0\fs20\uziy4409\hich\f0\dbch\f0\loch\f0\fs20\ql\par  \'b7  {\*\bkmkstart zs80260255539}{\*\bkmkend cg11966014750}Problem: {\*\bkmkstart qd95546944213}{\*\bkmkend op65658503286}HLD (hyperlipidemia). \par  \'b7  {\*\bkmkstart oa54339952302}{\*\bkmkend gl36048696163}Plan: {\*\bkmkstart pq78796838180}{\*\bkmkend lc57164767445}Home med: Crestor \par  Need formal med recc for dosage and frequency \par  \par  -Crestor 5 mg qhs.\par  \par  \plain\f1\fs20\ufje1458\hich\f1\dbch\f1\loch\f1\cf2\fs20\ul{\field{\*\fldinst HYPERLINK [id number] }{\fldrslt Problem/Plan - 9:}}\plain\f0\fs20\qrip5750\hich\f0\dbch\f0\loch\f0\fs20\ql\par  \'b7  {\*\bkmkstart ov34135508925}{\*\bkmkend el01441734620}Problem: {\*\bkmkstart go01915801402}{\*\bkmkend hf37794772948}MG (myasthenia gravis). \par  \'b7  {\*\bkmkstart zy98846133259}{\*\bkmkend vn42010911043}Plan: {\*\bkmkstart zp64899810445}{\*\bkmkend mr05697860872}Home med: pyridostigmine\par  \par  Need formal med recc for dosage and frequency.\par  \par  \plain\f1\fs20\qesw1074\hich\f1\dbch\f1\loch\f1\cf2\fs20\ul{\field{\*\fldinst HYPERLINK [id number] }{\fldrslt Problem/Plan - 10:}}\plain\f0\fs20\snig9190\hich\f0\dbch\f0\loch\f0\fs20\ql\par  \'b7  {\*\bkmkstart ii32210469761}{\*\bkmkend sh64191381968}Problem: {\*\bkmkstart ro94182036992}{\*\bkmkend qi74385772497}Leg edema. \par  \'b7  {\*\bkmkstart qx21437350441}{\*\bkmkend jf90974975958}Plan; {\*\bkmkstart qr43289418565}{\*\bkmkend ij39859309545}h/o b/l leg edema \par  +1 edema above the edema, trace pit edema below the knee\par  home med: lasix eod\par  \par  Need formal med recc for dosage and frequency.\par  \par  \plain\f1\fs20\oars6126\hich\f1\dbch\f1\loch\f1\cf2\fs20\ul{\field{\*\fldinst HYPERLINK [id number] }{\fldrslt Problem/Plan - 11:}}\plain\f0\fs20\tcdt9649\hich\f0\dbch\f0\loch\f0\fs20\ql\par  \'b7  {\*\bkmkstart js342333321194}{\*\bkmkend bc734055668493}Problem: {\*\bkmkstart ku503535474802}{\*\bkmkend us934642863437}Prophylactic measure. \par  \'b7  {\*\bkmkstart sj184913312116}{\*\bkmkend oa550866992129}Plan: {\*\bkmkstart lx499877655214}{\*\bkmkend wj105950359364}Fluids: as needed\par  dvt prophy: Eliquis 2.5 mg bid \par  electrolytes: as needed\par  diet:  reg.\par  \par  \par  }

## 2025-02-12 NOTE — PROGRESS NOTE ADULT - PROBLEM SELECTOR PLAN 3
2/10 Chest X-ray as follows: There is increased opacity in the right mid and lower lung zones, suggesting the possibility of multifocal pneumonia. A small right-sided pleural effusion may be present.    Patient most likely has a superimposed bacterial pneumonia and Influenza A    2/10 Legionella and Strep Pneumo urine antigen negative, s/p azithromycin 500 mg   2/10 Procalcitonin 27.96  2/11 PA lateral chest xeay:  Right lower lung consolidation and small pleural effusion. Minimal left   lower lung focal atelectasis. No pneumothorax. Mild hypoinflation.       -Ceftriaxone 1g qd for 5 days    -Duoneb q6hrs  -Incentive Spirometry  -650 mg Tylenol q6hrs PRN fever or pain
2/10 Chest X-ray as follows: There is increased opacity in the right mid and lower lung zones, suggesting the possibility of multifocal pneumonia. A small right-sided pleural effusion may be present.    Patient most likely has a superimposed bacterial pneumonia and Influenza A    2/10 Legionella and Strep Pneumo urine antigen negative, s/p azithromycin 500 mg   2/10 Procalcitonin 27.96  2/12 Procalcitonin 25.20  2/11 PA lateral chest XRAY:  Right lower lung consolidation and small pleural effusion. Minimal left   lower lung focal atelectasis. No pneumothorax. Mild hypoinflation.       -Ceftriaxone 1g qd for 5 days    -Duoneb q6hrs  -Incentive Spirometry  -650 mg Tylenol q6hrs PRN fever or pain  -Mucinex 600 mg bid

## 2025-02-12 NOTE — OCCUPATIONAL THERAPY INITIAL EVALUATION ADULT - ADDITIONAL COMMENTS
Pt r handed and does not wear glasses. Pt lives with wife. Pt and wife split time between home in [place] and apt in [place]; pt will need to do stairs when navigating home and community. Pt has access to both shower stall and tub; both equipped with grab bars.

## 2025-02-12 NOTE — DISCHARGE NOTE PROVIDER - NSDCCPCAREPLAN_GEN_ALL_CORE_FT
PRINCIPAL DISCHARGE DIAGNOSIS  Diagnosis: CAP (community acquired pneumonia)  Assessment and Plan of Treatment: Community-acquired pneumonia (CAP) is a type of lung infection that you can catch outside of a healthcare setting, like a hospital or nursing home. It occurs when bacteria, viruses, or fungi infect the lungs, causing inflammation and fluid buildup. This can make it difficult for you to breathe and get enough oxygen.  Symptoms of CAP can include a cough (which may produce phlegm), fever, chills, shortness of breath, chest pain when you breathe or cough, fatigue, and a loss of appetite. Some people might also experience confusion or changes in mental awareness, especially older adults.  CAP is usually diagnosed with a physical examination, listening to your lungs with a stethoscope, and sometimes a chest X-ray or blood tests to identify the cause of the infection.  Treatment often involves antibiotics if the cause is bacterial, as well as rest, fluids, and over-the-counter medications to help relieve symptoms like fever and pain. In some cases, especially if the pneumonia is severe or if you have other health conditions, hospitalization may be necessary.  It’s important to follow your healthcare provider's instructions and complete the full course of any prescribed antibiotics, even if you start feeling better, to ensure the infection is fully treated. Additionally, getting vaccinated against flu and pneumonia can help prevent CAP. If you have any questions or concerns about your symptoms or treatment, be sure to discuss them with your healthcare provider.  -Please take ONE tablet of cefpodoxime 200 mg TWO  times a day until completion      SECONDARY DISCHARGE DIAGNOSES  Diagnosis: Acute hypoxemic respiratory failure  Assessment and Plan of Treatment: Acute hypoxemic respiratory failure  is a condition where the lungs can't provide enough oxygen to the blood. This can happen suddenly and might be due to various causes like infections, such as pneumonia, which makes it difficult to breathe. As a result, the oxygen levels in the body drop, requiring medical intervention, like oxygen therapy, to help improve breathing and oxygenation.

## 2025-02-12 NOTE — PROGRESS NOTE ADULT - TIME BILLING
Review of hospital course, labs, vitals and medical records  Bedside exam and patient interview   Discussion of plan with housestaff and consultants   Documenting the encounter
Review of hospital course, labs, vitals and medical records  Bedside exam and patient interview   Discussion of plan with housestaff and consultants   Documenting the encounter

## 2025-02-12 NOTE — PROGRESS NOTE ADULT - PROBLEM SELECTOR PLAN 9
Home med: pyridostigmine 120 mg  TID  2/11 per speech and swallow assessment: Regular consistency and Thin liquids as tolerated    -pyridostigmine 120 mg TID  -diet: Regular consistency and Thin liquids as tolerated
Home med: pyridostigmine 120 mg  TID  2/11 per speech and swallow assessment: Regular consistency and Thin liquids as tolerated    -pyridostigmine 120 mg TID  -diet: Regular consistency and Thin liquids as tolerated

## 2025-02-12 NOTE — PROGRESS NOTE ADULT - PROBLEM SELECTOR PLAN 5
Patient satted to 86% on room air in the ED. Placed on 3 L NC now satting 92-94%    -Duoneb q6hrs  -Incentive Spirometry  -Wean to room air as tolerated  -Treat infections as above
Patient satted to 86% on room air in the ED. Placed on 3 L NC now satting 92-94%    -Duoneb q6hrs  -Incentive Spirometry  -Wean to room air as tolerated  -Treat infections as above

## 2025-02-12 NOTE — PROGRESS NOTE ADULT - PROBLEM SELECTOR PLAN 6
Mechanical fall at home without LOC with head strike  2/6 CT Head and Spine: CT scans revealed no acute intracranial hemorrhage, facial bone fractures, or cervical spine fractures, but showed chronic small vessel disease, soft tissue contusions/hematomas on the right side of the face, paranasal sinus disease, and degenerative changes in the cervical spine
Mechanical fall at home without LOC with head strike  2/6 CT Head and Spine: CT scans revealed no acute intracranial hemorrhage, facial bone fractures, or cervical spine fractures, but showed chronic small vessel disease, soft tissue contusions/hematomas on the right side of the face, paranasal sinus disease, and degenerative changes in the cervical spine  -Pt consulted; f/u reccs

## 2025-02-12 NOTE — PROGRESS NOTE ADULT - ATTENDING COMMENTS
89-year-old male with a PMHx of MG, HLD, Afib (on Eliquis) and recent ED visit following a fall who presented with metabolic encephalopathy and acute hypoxic respiratory failure 2/2 severe sepsis 2/2 Influenza A with superimposed bacterial pneumonia.        Plan:     -completed 5-day course of Tamiflu that was initiated as an outpatient    -continue with CTX and Azithromycin    -repeat procalcitonin, BCx NGTD   -wean 02 as tolerated, obtain ambulatory pulse ox     -PT recommends home PT    Rest of plan as per resident note.
89-year-old male with a PMHx of MG, HLD, Afib (on Eliquis) and recent ED visit following a fall who presented with metabolic encephalopathy and acute hypoxic respiratory failure 2/2 severe sepsis 2/2 Influenza A with superimposed bacterial pneumonia.       Plan:    -already completed 5-day course of Tamiflu that was initiated as an outpatient   -continue with CTX, can discontinue Azithromycin   -follow up procalcitonin and BCx    -wean 02 as tolerated, obtain ambulatory pulse ox    -PT consult    -SLP consult     Rest of plan as per resident note.

## 2025-02-12 NOTE — DISCHARGE NOTE PROVIDER - NSDCMRMEDTOKEN_GEN_ALL_CORE_FT
apixaban 2.5 mg oral tablet: 1 tab(s) orally 2 times a day  cefpodoxime 200 mg oral tablet: 1 tab(s) orally 2 times a day  hydroCHLOROthiazide 25 mg oral tablet: 1 tab(s) orally once a day  pyRIDostigmine 60 mg oral tablet: 2 tab(s) orally 3 times a day  rosuvastatin 5 mg oral tablet: 1 tab(s) orally once a day

## 2025-02-12 NOTE — PROGRESS NOTE ADULT - ASSESSMENT
Patient is an 89 year old male with a PMH of Myasthenia Gravis, HLD, Atrial Fibrillation with a recent admission to the [facility] ED for a fall with head strike presenting to the ED with symptoms of wet cough, runny nose, and altered mental status, found to Influenza A positive with superimposed bacterial pneumonia, now on Ceftriaxone and Azithromycin for CAP. 
Patient is an 89 year old male with a PMH of Myasthenia Gravis, HLD, Atrial Fibrillation with a recent admission to the [facility] ED for a fall with head strike presenting to the ED with symptoms of wet cough, runny nose, and altered mental status, found to Influenza A positive with superimposed bacterial pneumonia, now on Ceftriaxone and Azithromycin for CAP.

## 2025-02-12 NOTE — OCCUPATIONAL THERAPY INITIAL EVALUATION ADULT - PERTINENT HX OF CURRENT PROBLEM, REHAB EVAL
Patient is an 89 year old male with a PMH of Myasthenia Gravis, HLD, Atrial Fibrillation with a recent admission to the [facility] ED for a fall with head strike presenting to the ED with symptoms of wet cough, runny nose, and altered mental status, found to Influenza A positive with superimposed bacterial pneumonia, now on Ceftriaxone and Azithromycin for CAP.

## 2025-02-12 NOTE — OCCUPATIONAL THERAPY INITIAL EVALUATION ADULT - GENERAL OBSERVATIONS, REHAB EVAL
JULIANNE Bueno aware of intent to eval. Pt received seated in bedside chair +hep lock +NAD +wife at bedside; pt left as found with all lines intact and needs met, RN aware.

## 2025-02-12 NOTE — PROGRESS NOTE ADULT - PROBLEM SELECTOR PLAN 2
RESOLVED.     Patient brought to ED due to altered mental status and rigors. Patient became less responsive to voice command and voice briefly, now resolved. no FND.     -CTM
RESOLVED.     Patient brought to ED due to altered mental status and rigors. Patient became less responsive to voice command and voice briefly, now resolved. no FND.     -CTM

## 2025-02-12 NOTE — PROGRESS NOTE ADULT - PROBLEM SELECTOR PLAN 4
Patient tested positive for Influenza A at Urgent care on 2/6. Has been taking Tamiflu since 2/6. Should be completing 5  day course by today.   s/p Tamiflu 75 mg bid
Patient tested positive for Influenza A at Urgent care on 2/6. Has been taking Tamiflu since 2/6. Should be completing 5  day course by today.   s/p Tamiflu 75 mg bid

## 2025-02-12 NOTE — PROGRESS NOTE ADULT - PROBLEM SELECTOR PLAN 8
Home med: Crestor   Need formal med recc for dosage and frequency     -Crestor 5 mg qhs
Home med: Crestor   Need formal med recc for dosage and frequency     -Crestor 5 mg qhs

## 2025-02-12 NOTE — PROGRESS NOTE ADULT - PROBLEM SELECTOR PLAN 7
History of atrial fibrillation  Home med: Eliquis 2.5 mg BID    -ctw Home med: Eliquis 2.5 mg BID
History of atrial fibrillation  Home med: Eliquis 2.5 mg BID    -ctw Home med: Eliquis 2.5 mg BID  -f/u EKG

## 2025-02-13 ENCOUNTER — TRANSCRIPTION ENCOUNTER (OUTPATIENT)
Age: 89
End: 2025-02-13

## 2025-02-13 VITALS
RESPIRATION RATE: 18 BRPM | DIASTOLIC BLOOD PRESSURE: 66 MMHG | TEMPERATURE: 97 F | SYSTOLIC BLOOD PRESSURE: 113 MMHG | HEART RATE: 71 BPM | OXYGEN SATURATION: 93 %

## 2025-02-13 PROCEDURE — 82962 GLUCOSE BLOOD TEST: CPT

## 2025-02-13 PROCEDURE — 81001 URINALYSIS AUTO W/SCOPE: CPT

## 2025-02-13 PROCEDURE — 96365 THER/PROPH/DIAG IV INF INIT: CPT

## 2025-02-13 PROCEDURE — 85610 PROTHROMBIN TIME: CPT

## 2025-02-13 PROCEDURE — 87637 SARSCOV2&INF A&B&RSV AMP PRB: CPT

## 2025-02-13 PROCEDURE — 96368 THER/DIAG CONCURRENT INF: CPT

## 2025-02-13 PROCEDURE — 85730 THROMBOPLASTIN TIME PARTIAL: CPT

## 2025-02-13 PROCEDURE — 82803 BLOOD GASES ANY COMBINATION: CPT

## 2025-02-13 PROCEDURE — 97161 PT EVAL LOW COMPLEX 20 MIN: CPT

## 2025-02-13 PROCEDURE — 87640 STAPH A DNA AMP PROBE: CPT

## 2025-02-13 PROCEDURE — 87641 MR-STAPH DNA AMP PROBE: CPT

## 2025-02-13 PROCEDURE — 80053 COMPREHEN METABOLIC PANEL: CPT

## 2025-02-13 PROCEDURE — 83735 ASSAY OF MAGNESIUM: CPT

## 2025-02-13 PROCEDURE — 94640 AIRWAY INHALATION TREATMENT: CPT

## 2025-02-13 PROCEDURE — 97165 OT EVAL LOW COMPLEX 30 MIN: CPT

## 2025-02-13 PROCEDURE — 71045 X-RAY EXAM CHEST 1 VIEW: CPT

## 2025-02-13 PROCEDURE — 84100 ASSAY OF PHOSPHORUS: CPT

## 2025-02-13 PROCEDURE — 71046 X-RAY EXAM CHEST 2 VIEWS: CPT

## 2025-02-13 PROCEDURE — 92610 EVALUATE SWALLOWING FUNCTION: CPT

## 2025-02-13 PROCEDURE — 96375 TX/PRO/DX INJ NEW DRUG ADDON: CPT

## 2025-02-13 PROCEDURE — 99239 HOSP IP/OBS DSCHRG MGMT >30: CPT | Mod: GC

## 2025-02-13 PROCEDURE — 99285 EMERGENCY DEPT VISIT HI MDM: CPT

## 2025-02-13 PROCEDURE — 84145 PROCALCITONIN (PCT): CPT

## 2025-02-13 PROCEDURE — 0225U NFCT DS DNA&RNA 21 SARSCOV2: CPT

## 2025-02-13 PROCEDURE — 87899 AGENT NOS ASSAY W/OPTIC: CPT

## 2025-02-13 PROCEDURE — 83605 ASSAY OF LACTIC ACID: CPT

## 2025-02-13 PROCEDURE — 96366 THER/PROPH/DIAG IV INF ADDON: CPT

## 2025-02-13 PROCEDURE — 36415 COLL VENOUS BLD VENIPUNCTURE: CPT

## 2025-02-13 PROCEDURE — 87040 BLOOD CULTURE FOR BACTERIA: CPT

## 2025-02-13 PROCEDURE — 85025 COMPLETE CBC W/AUTO DIFF WBC: CPT

## 2025-02-13 RX ORDER — CEFPODOXIME PROXETIL 200 MG
1 TABLET ORAL
Qty: 16 | Refills: 0
Start: 2025-02-13 | End: 2025-02-20

## 2025-02-13 RX ORDER — IPRATROPIUM BROMIDE AND ALBUTEROL SULFATE .5; 2.5 MG/3ML; MG/3ML
3 SOLUTION RESPIRATORY (INHALATION) ONCE
Refills: 0 | Status: COMPLETED | OUTPATIENT
Start: 2025-02-13 | End: 2025-02-13

## 2025-02-13 RX ADMIN — IPRATROPIUM BROMIDE AND ALBUTEROL SULFATE 3 MILLILITER(S): .5; 2.5 SOLUTION RESPIRATORY (INHALATION) at 11:34

## 2025-02-13 RX ADMIN — PYRIDOSTIGMINE BROMIDE 120 MILLIGRAM(S): 60 SOLUTION ORAL at 06:43

## 2025-02-13 RX ADMIN — CEFTRIAXONE 100 MILLIGRAM(S): 250 INJECTION, POWDER, FOR SOLUTION INTRAMUSCULAR; INTRAVENOUS at 11:34

## 2025-02-13 RX ADMIN — IPRATROPIUM BROMIDE AND ALBUTEROL SULFATE 3 MILLILITER(S): .5; 2.5 SOLUTION RESPIRATORY (INHALATION) at 05:14

## 2025-02-13 RX ADMIN — APIXABAN 2.5 MILLIGRAM(S): 5 TABLET, FILM COATED ORAL at 06:43

## 2025-02-13 RX ADMIN — Medication 600 MILLIGRAM(S): at 06:43

## 2025-02-13 NOTE — DISCHARGE NOTE NURSING/CASE MANAGEMENT/SOCIAL WORK - PATIENT PORTAL LINK FT
You can access the FollowMyHealth Patient Portal offered by [facility] by registering at the following website: [email or web address]. By joining [employer]’s FollowMyHealth portal, you will also be able to view your health information using other applications (apps) compatible with our system.

## 2025-02-13 NOTE — DISCHARGE NOTE NURSING/CASE MANAGEMENT/SOCIAL WORK - NSDCVIVACCINE_GEN_ALL_CORE_FT
Tdap; 08-Feb-2024 14:37; Akosua Fraire (RN); [employer]; [id number] (Exp. Date: 23-Nov-2025); IntraMuscular; Deltoid Left.; 0.5 milliLiter(s); VIS (VIS Published: 09-May-2013, VIS Presented: 08-Feb-2024);

## 2025-02-13 NOTE — DISCHARGE NOTE NURSING/CASE MANAGEMENT/SOCIAL WORK - FINANCIAL ASSISTANCE
[facility] provides services at a reduced cost to those who are determined to be eligible through [facility]’s financial assistance program. Information regarding [facility]’s financial assistance program can be found by going to [email or web address] or by calling [phone].

## 2025-02-13 NOTE — DISCHARGE NOTE NURSING/CASE MANAGEMENT/SOCIAL WORK - NSDCFUADDAPPT_GEN_ALL_CORE_FT
Please call to schedule a skin check appointment with Dr. Isabela Del Cid  [address] [place], [place] [address] [phone]

## 2025-02-15 LAB
CULTURE RESULTS: SIGNIFICANT CHANGE UP
CULTURE RESULTS: SIGNIFICANT CHANGE UP
SPECIMEN SOURCE: SIGNIFICANT CHANGE UP
SPECIMEN SOURCE: SIGNIFICANT CHANGE UP

## 2025-02-19 DIAGNOSIS — E78.5 HYPERLIPIDEMIA, UNSPECIFIED: ICD-10-CM

## 2025-02-19 DIAGNOSIS — G70.00 MYASTHENIA GRAVIS WITHOUT (ACUTE) EXACERBATION: ICD-10-CM

## 2025-02-19 DIAGNOSIS — A41.9 SEPSIS, UNSPECIFIED ORGANISM: ICD-10-CM

## 2025-02-19 DIAGNOSIS — R65.20 SEVERE SEPSIS WITHOUT SEPTIC SHOCK: ICD-10-CM

## 2025-02-19 DIAGNOSIS — I48.91 UNSPECIFIED ATRIAL FIBRILLATION: ICD-10-CM

## 2025-02-19 DIAGNOSIS — J96.01 ACUTE RESPIRATORY FAILURE WITH HYPOXIA: ICD-10-CM

## 2025-02-19 DIAGNOSIS — J10.00 INFLUENZA DUE TO OTHER IDENTIFIED INFLUENZA VIRUS WITH UNSPECIFIED TYPE OF PNEUMONIA: ICD-10-CM

## 2025-02-19 DIAGNOSIS — G93.41 METABOLIC ENCEPHALOPATHY: ICD-10-CM

## 2025-02-19 DIAGNOSIS — J18.9 PNEUMONIA, UNSPECIFIED ORGANISM: ICD-10-CM
